# Patient Record
Sex: FEMALE | Race: OTHER | HISPANIC OR LATINO | URBAN - METROPOLITAN AREA
[De-identification: names, ages, dates, MRNs, and addresses within clinical notes are randomized per-mention and may not be internally consistent; named-entity substitution may affect disease eponyms.]

---

## 2017-03-08 ENCOUNTER — EMERGENCY (EMERGENCY)
Facility: HOSPITAL | Age: 32
LOS: 1 days | Discharge: HOME | End: 2017-03-08
Attending: EMERGENCY MEDICINE | Admitting: EMERGENCY MEDICINE
Payer: MEDICAID

## 2017-03-08 VITALS
OXYGEN SATURATION: 100 % | DIASTOLIC BLOOD PRESSURE: 71 MMHG | HEART RATE: 86 BPM | TEMPERATURE: 99 F | RESPIRATION RATE: 17 BRPM | SYSTOLIC BLOOD PRESSURE: 130 MMHG

## 2017-03-08 VITALS
TEMPERATURE: 98 F | DIASTOLIC BLOOD PRESSURE: 80 MMHG | HEART RATE: 80 BPM | SYSTOLIC BLOOD PRESSURE: 110 MMHG | OXYGEN SATURATION: 100 % | RESPIRATION RATE: 16 BRPM

## 2017-03-08 DIAGNOSIS — O20.0 THREATENED ABORTION: ICD-10-CM

## 2017-03-08 LAB
ALBUMIN SERPL ELPH-MCNC: 4.4 G/DL — SIGNIFICANT CHANGE UP (ref 3.3–5)
ALP SERPL-CCNC: 46 U/L — SIGNIFICANT CHANGE UP (ref 40–120)
ALT FLD-CCNC: 12 U/L RC — SIGNIFICANT CHANGE UP (ref 10–45)
ANION GAP SERPL CALC-SCNC: 15 MMOL/L — SIGNIFICANT CHANGE UP (ref 5–17)
AST SERPL-CCNC: 16 U/L — SIGNIFICANT CHANGE UP (ref 10–40)
BILIRUB SERPL-MCNC: 0.2 MG/DL — SIGNIFICANT CHANGE UP (ref 0.2–1.2)
BLD GP AB SCN SERPL QL: NEGATIVE — SIGNIFICANT CHANGE UP
BUN SERPL-MCNC: 12 MG/DL — SIGNIFICANT CHANGE UP (ref 7–23)
CALCIUM SERPL-MCNC: 9.1 MG/DL — SIGNIFICANT CHANGE UP (ref 8.4–10.5)
CHLORIDE SERPL-SCNC: 104 MMOL/L — SIGNIFICANT CHANGE UP (ref 96–108)
CO2 SERPL-SCNC: 21 MMOL/L — LOW (ref 22–31)
CREAT SERPL-MCNC: 0.55 MG/DL — SIGNIFICANT CHANGE UP (ref 0.5–1.3)
GLUCOSE SERPL-MCNC: 80 MG/DL — SIGNIFICANT CHANGE UP (ref 70–99)
HCG SERPL-ACNC: SIGNIFICANT CHANGE UP MIU/ML
HCT VFR BLD CALC: 29.6 % — LOW (ref 34.5–45)
HGB BLD-MCNC: 9.2 G/DL — LOW (ref 11.5–15.5)
MCHC RBC-ENTMCNC: 20.7 PG — LOW (ref 27–34)
MCHC RBC-ENTMCNC: 31.1 GM/DL — LOW (ref 32–36)
MCV RBC AUTO: 66.5 FL — LOW (ref 80–100)
PLATELET # BLD AUTO: 300 K/UL — SIGNIFICANT CHANGE UP (ref 150–400)
POTASSIUM SERPL-MCNC: 3.7 MMOL/L — SIGNIFICANT CHANGE UP (ref 3.5–5.3)
POTASSIUM SERPL-SCNC: 3.7 MMOL/L — SIGNIFICANT CHANGE UP (ref 3.5–5.3)
PROT SERPL-MCNC: 7.3 G/DL — SIGNIFICANT CHANGE UP (ref 6–8.3)
RBC # BLD: 4.46 M/UL — SIGNIFICANT CHANGE UP (ref 3.8–5.2)
RBC # FLD: 19.4 % — HIGH (ref 10.3–14.5)
RH IG SCN BLD-IMP: POSITIVE — SIGNIFICANT CHANGE UP
RH IG SCN BLD-IMP: POSITIVE — SIGNIFICANT CHANGE UP
SODIUM SERPL-SCNC: 140 MMOL/L — SIGNIFICANT CHANGE UP (ref 135–145)
WBC # BLD: 10 K/UL — SIGNIFICANT CHANGE UP (ref 3.8–10.5)
WBC # FLD AUTO: 10 K/UL — SIGNIFICANT CHANGE UP (ref 3.8–10.5)

## 2017-03-08 PROCEDURE — 87624 HPV HI-RISK TYP POOLED RSLT: CPT

## 2017-03-08 PROCEDURE — 99284 EMERGENCY DEPT VISIT MOD MDM: CPT | Mod: 25

## 2017-03-08 PROCEDURE — 76815 OB US LIMITED FETUS(S): CPT | Mod: 26

## 2017-03-08 PROCEDURE — 84702 CHORIONIC GONADOTROPIN TEST: CPT

## 2017-03-08 PROCEDURE — 85027 COMPLETE CBC AUTOMATED: CPT

## 2017-03-08 PROCEDURE — 76817 TRANSVAGINAL US OBSTETRIC: CPT | Mod: 26

## 2017-03-08 PROCEDURE — 86901 BLOOD TYPING SEROLOGIC RH(D): CPT

## 2017-03-08 PROCEDURE — 76817 TRANSVAGINAL US OBSTETRIC: CPT

## 2017-03-08 PROCEDURE — 80053 COMPREHEN METABOLIC PANEL: CPT

## 2017-03-08 PROCEDURE — 86900 BLOOD TYPING SEROLOGIC ABO: CPT

## 2017-03-08 PROCEDURE — 88175 CYTOPATH C/V AUTO FLUID REDO: CPT

## 2017-03-08 PROCEDURE — 76815 OB US LIMITED FETUS(S): CPT

## 2017-03-08 PROCEDURE — 86850 RBC ANTIBODY SCREEN: CPT

## 2017-03-08 PROCEDURE — 88141 CYTOPATH C/V INTERPRET: CPT

## 2017-03-08 PROCEDURE — 88305 TISSUE EXAM BY PATHOLOGIST: CPT | Mod: 26

## 2017-03-08 PROCEDURE — 99285 EMERGENCY DEPT VISIT HI MDM: CPT

## 2017-03-08 PROCEDURE — 88305 TISSUE EXAM BY PATHOLOGIST: CPT

## 2017-03-08 NOTE — ED ADULT NURSE NOTE - OBJECTIVE STATEMENT
Patient   is  alert  and  oriented x3.  Color  is  good and  skin warm to touch.  She  is  c/o  vaginal  bleeding.  She  denies  cramping.

## 2017-03-08 NOTE — ED PROVIDER NOTE - OBJECTIVE STATEMENT
33 y/o F no pmhx LMP 2/12/17 presenting with spotting since yesterday. Pt reports that she has been having breast tenderness and pain for a few days, took a home pregnancy test on Sunday which was positive. States that she started spotting last night, and when it continued today she decided to come in. She reports that she just moved here from [place] and does not have a PMD or OBGYN to follow-up with. Denies any fevers, chills, nausea, vomiting, diarrhea or constipation. Reports changing bad approximately every 5 hours.

## 2017-03-08 NOTE — ED PROVIDER NOTE - CARE PLAN
Principal Discharge DX:	Threatened   Instructions for follow-up, activity and diet:	1. Take tylenol as needed if any pain develops   2. It is necessary for you to follow-up by next week with OBGYN clinic at [phone] to have your threatened pregnancy reevaluated and to have your tissue pathology evaluated.   3. Return to the ER for any new or worsening symptoms. Principal Discharge DX:	Threatened   Instructions for follow-up, activity and diet:	1. Take tylenol as needed if any pain develops   2. It is necessary for you to follow-up by next week with OBGYN clinic at [phone] to have your threatened pregnancy reevaluated and to have your tissue pathology evaluated.   3. Return to the ER for any new or worsening symptoms.

## 2017-03-08 NOTE — ED PROVIDER NOTE - PLAN OF CARE
1. Take tylenol as needed if any pain develops   2. It is necessary for you to follow-up by next week with OBGYN clinic at [phone] to have your threatened pregnancy reevaluated and to have your tissue pathology evaluated.   3. Return to the ER for any new or worsening symptoms.

## 2017-03-08 NOTE — ED PROVIDER NOTE - PROGRESS NOTE DETAILS
OBGYN states patient's pregnancy is visualized in the OS. Calling attending to remove products. -Fadumo Douglas PA-C After reevaluation, OBGYN team states that they reevaluated the US which showed that the pregnancy is too high up to be the cervical mass that is here, and thinking now differential includes placental tissue from aborting twin pregnancy, or cervical tissue from possible cervical CA. patient reports this is a pregnancy that is wanted, therefore will leave things as is and tissue from mass was sent to pathology which will come back next week. OBGYN team discussed with patient her risks of miscarriage at this time with open os and patient understands and wishes to move forward with pregnancy at this time. patient to follow-up in clinic next week for reevaluation and discussion of results of cervical tissue pathology. patient understands and agrees and is stable for d/c at this time. -Fadumo Douglas PA-C

## 2017-03-08 NOTE — ED PROVIDER NOTE - ATTENDING CONTRIBUTION TO CARE
pt is a 31 y/o female pvb about 3-4 weeks by dates with vag spotting the past few weeks now, abd soft, nt. vss. tv us, labs ordered r/o ectopic vs threated ab.

## 2017-03-22 ENCOUNTER — OUTPATIENT (OUTPATIENT)
Dept: OUTPATIENT SERVICES | Facility: HOSPITAL | Age: 32
LOS: 1 days | End: 2017-03-22
Payer: SELF-PAY

## 2017-03-22 ENCOUNTER — APPOINTMENT (OUTPATIENT)
Dept: OBGYN | Facility: CLINIC | Age: 32
End: 2017-03-22

## 2017-03-22 VITALS
BODY MASS INDEX: 26.99 KG/M2 | WEIGHT: 162 LBS | HEIGHT: 65 IN | DIASTOLIC BLOOD PRESSURE: 80 MMHG | SYSTOLIC BLOOD PRESSURE: 122 MMHG

## 2017-03-22 DIAGNOSIS — Z86.2 PERSONAL HISTORY OF DISEASES OF THE BLOOD AND BLOOD-FORMING ORGANS AND CERTAIN DISORDERS INVOLVING THE IMMUNE MECHANISM: ICD-10-CM

## 2017-03-22 DIAGNOSIS — Z32.01 ENCOUNTER FOR PREGNANCY TEST, RESULT POSITIVE: ICD-10-CM

## 2017-03-22 DIAGNOSIS — O20.9 HEMORRHAGE IN EARLY PREGNANCY, UNSPECIFIED: ICD-10-CM

## 2017-03-22 DIAGNOSIS — N84.1 POLYP OF CERVIX UTERI: ICD-10-CM

## 2017-03-22 DIAGNOSIS — N76.0 ACUTE VAGINITIS: ICD-10-CM

## 2017-03-22 PROCEDURE — G0463: CPT | Mod: 25

## 2017-03-22 PROCEDURE — 76857 US EXAM PELVIC LIMITED: CPT

## 2017-03-23 DIAGNOSIS — Z32.01 ENCOUNTER FOR PREGNANCY TEST, RESULT POSITIVE: ICD-10-CM

## 2017-03-23 DIAGNOSIS — N84.1 POLYP OF CERVIX UTERI: ICD-10-CM

## 2017-03-23 DIAGNOSIS — O20.9 HEMORRHAGE IN EARLY PREGNANCY, UNSPECIFIED: ICD-10-CM

## 2017-04-22 ENCOUNTER — RESULT REVIEW (OUTPATIENT)
Age: 32
End: 2017-04-22

## 2017-04-22 ENCOUNTER — EMERGENCY (EMERGENCY)
Facility: HOSPITAL | Age: 32
LOS: 1 days | Discharge: HOME | End: 2017-04-22
Attending: EMERGENCY MEDICINE | Admitting: EMERGENCY MEDICINE
Payer: MEDICAID

## 2017-04-22 VITALS
OXYGEN SATURATION: 100 % | SYSTOLIC BLOOD PRESSURE: 118 MMHG | DIASTOLIC BLOOD PRESSURE: 73 MMHG | TEMPERATURE: 98 F | RESPIRATION RATE: 16 BRPM | HEART RATE: 112 BPM

## 2017-04-22 VITALS
SYSTOLIC BLOOD PRESSURE: 105 MMHG | DIASTOLIC BLOOD PRESSURE: 71 MMHG | TEMPERATURE: 99 F | OXYGEN SATURATION: 100 % | HEART RATE: 79 BPM | RESPIRATION RATE: 17 BRPM

## 2017-04-22 DIAGNOSIS — Z90.49 ACQUIRED ABSENCE OF OTHER SPECIFIED PARTS OF DIGESTIVE TRACT: ICD-10-CM

## 2017-04-22 DIAGNOSIS — O03.9 COMPLETE OR UNSPECIFIED SPONTANEOUS ABORTION WITHOUT COMPLICATION: ICD-10-CM

## 2017-04-22 DIAGNOSIS — R00.0 TACHYCARDIA, UNSPECIFIED: ICD-10-CM

## 2017-04-22 DIAGNOSIS — Z90.49 ACQUIRED ABSENCE OF OTHER SPECIFIED PARTS OF DIGESTIVE TRACT: Chronic | ICD-10-CM

## 2017-04-22 DIAGNOSIS — O20.9 HEMORRHAGE IN EARLY PREGNANCY, UNSPECIFIED: ICD-10-CM

## 2017-04-22 LAB
ANISOCYTOSIS BLD QL: SLIGHT — SIGNIFICANT CHANGE UP
ANISOCYTOSIS BLD QL: SLIGHT — SIGNIFICANT CHANGE UP
APTT BLD: 27.9 SEC — SIGNIFICANT CHANGE UP (ref 27.5–37.4)
BASOPHILS # BLD AUTO: 0.1 K/UL — SIGNIFICANT CHANGE UP (ref 0–0.2)
BASOPHILS # BLD AUTO: 0.1 K/UL — SIGNIFICANT CHANGE UP (ref 0–0.2)
BASOPHILS NFR BLD AUTO: 0.5 % — SIGNIFICANT CHANGE UP (ref 0–2)
BASOPHILS NFR BLD AUTO: 0.8 % — SIGNIFICANT CHANGE UP (ref 0–2)
BLD GP AB SCN SERPL QL: NEGATIVE — SIGNIFICANT CHANGE UP
DACRYOCYTES BLD QL SMEAR: SLIGHT — SIGNIFICANT CHANGE UP
ELLIPTOCYTES BLD QL SMEAR: SLIGHT — SIGNIFICANT CHANGE UP
EOSINOPHIL # BLD AUTO: 0 K/UL — SIGNIFICANT CHANGE UP (ref 0–0.5)
EOSINOPHIL # BLD AUTO: 0.1 K/UL — SIGNIFICANT CHANGE UP (ref 0–0.5)
EOSINOPHIL NFR BLD AUTO: 0.2 % — SIGNIFICANT CHANGE UP (ref 0–6)
EOSINOPHIL NFR BLD AUTO: 0.3 % — SIGNIFICANT CHANGE UP (ref 0–6)
HCG SERPL-ACNC: SIGNIFICANT CHANGE UP MIU/ML
HCT VFR BLD CALC: 24.6 % — LOW (ref 34.5–45)
HCT VFR BLD CALC: 25.8 % — LOW (ref 34.5–45)
HGB BLD-MCNC: 8.1 G/DL — LOW (ref 11.5–15.5)
HGB BLD-MCNC: 8.2 G/DL — LOW (ref 11.5–15.5)
HYPOCHROMIA BLD QL: SIGNIFICANT CHANGE UP
HYPOCHROMIA BLD QL: SIGNIFICANT CHANGE UP
INR BLD: 1.01 RATIO — SIGNIFICANT CHANGE UP (ref 0.88–1.16)
LYMPHOCYTES # BLD AUTO: 1.8 K/UL — SIGNIFICANT CHANGE UP (ref 1–3.3)
LYMPHOCYTES # BLD AUTO: 15.1 % — SIGNIFICANT CHANGE UP (ref 13–44)
LYMPHOCYTES # BLD AUTO: 20.1 % — SIGNIFICANT CHANGE UP (ref 13–44)
LYMPHOCYTES # BLD AUTO: 3.5 K/UL — HIGH (ref 1–3.3)
MACROCYTES BLD QL: SLIGHT — SIGNIFICANT CHANGE UP
MCHC RBC-ENTMCNC: 21.3 PG — LOW (ref 27–34)
MCHC RBC-ENTMCNC: 22.3 PG — LOW (ref 27–34)
MCHC RBC-ENTMCNC: 31.2 GM/DL — LOW (ref 32–36)
MCHC RBC-ENTMCNC: 33.1 GM/DL — SIGNIFICANT CHANGE UP (ref 32–36)
MCV RBC AUTO: 67.2 FL — LOW (ref 80–100)
MCV RBC AUTO: 68.1 FL — LOW (ref 80–100)
MICROCYTES BLD QL: SIGNIFICANT CHANGE UP
MICROCYTES BLD QL: SIGNIFICANT CHANGE UP
MONOCYTES # BLD AUTO: 0.8 K/UL — SIGNIFICANT CHANGE UP (ref 0–0.9)
MONOCYTES # BLD AUTO: 1.2 K/UL — HIGH (ref 0–0.9)
MONOCYTES NFR BLD AUTO: 7 % — SIGNIFICANT CHANGE UP (ref 2–14)
MONOCYTES NFR BLD AUTO: 7 % — SIGNIFICANT CHANGE UP (ref 2–14)
NEUTROPHILS # BLD AUTO: 12.5 K/UL — HIGH (ref 1.8–7.4)
NEUTROPHILS # BLD AUTO: 9.1 K/UL — HIGH (ref 1.8–7.4)
NEUTROPHILS NFR BLD AUTO: 71.7 % — SIGNIFICANT CHANGE UP (ref 43–77)
NEUTROPHILS NFR BLD AUTO: 77.2 % — HIGH (ref 43–77)
PLAT MORPH BLD: NORMAL — SIGNIFICANT CHANGE UP
PLAT MORPH BLD: NORMAL — SIGNIFICANT CHANGE UP
PLATELET # BLD AUTO: 189 K/UL — SIGNIFICANT CHANGE UP (ref 150–400)
PLATELET # BLD AUTO: 213 K/UL — SIGNIFICANT CHANGE UP (ref 150–400)
POIKILOCYTOSIS BLD QL AUTO: SLIGHT — SIGNIFICANT CHANGE UP
POLYCHROMASIA BLD QL SMEAR: SLIGHT — SIGNIFICANT CHANGE UP
PROTHROM AB SERPL-ACNC: 11 SEC — SIGNIFICANT CHANGE UP (ref 9.8–12.7)
RBC # BLD: 3.66 M/UL — LOW (ref 3.8–5.2)
RBC # BLD: 3.79 M/UL — LOW (ref 3.8–5.2)
RBC # FLD: 19.7 % — HIGH (ref 10.3–14.5)
RBC # FLD: 19.7 % — HIGH (ref 10.3–14.5)
RBC BLD AUTO: ABNORMAL
RBC BLD AUTO: ABNORMAL
RH IG SCN BLD-IMP: POSITIVE — SIGNIFICANT CHANGE UP
SCHISTOCYTES BLD QL AUTO: SLIGHT — SIGNIFICANT CHANGE UP
TARGETS BLD QL SMEAR: SLIGHT — SIGNIFICANT CHANGE UP
WBC # BLD: 11.7 K/UL — HIGH (ref 3.8–10.5)
WBC # BLD: 17.4 K/UL — HIGH (ref 3.8–10.5)
WBC # FLD AUTO: 11.7 K/UL — HIGH (ref 3.8–10.5)
WBC # FLD AUTO: 17.4 K/UL — HIGH (ref 3.8–10.5)

## 2017-04-22 PROCEDURE — 99285 EMERGENCY DEPT VISIT HI MDM: CPT

## 2017-04-22 PROCEDURE — 86900 BLOOD TYPING SEROLOGIC ABO: CPT

## 2017-04-22 PROCEDURE — 93975 VASCULAR STUDY: CPT

## 2017-04-22 PROCEDURE — 85730 THROMBOPLASTIN TIME PARTIAL: CPT

## 2017-04-22 PROCEDURE — 86850 RBC ANTIBODY SCREEN: CPT

## 2017-04-22 PROCEDURE — 88309 TISSUE EXAM BY PATHOLOGIST: CPT | Mod: 26

## 2017-04-22 PROCEDURE — 85610 PROTHROMBIN TIME: CPT

## 2017-04-22 PROCEDURE — 99284 EMERGENCY DEPT VISIT MOD MDM: CPT | Mod: 25

## 2017-04-22 PROCEDURE — 93975 VASCULAR STUDY: CPT | Mod: 26

## 2017-04-22 PROCEDURE — 76830 TRANSVAGINAL US NON-OB: CPT | Mod: 26

## 2017-04-22 PROCEDURE — 85027 COMPLETE CBC AUTOMATED: CPT

## 2017-04-22 PROCEDURE — 76830 TRANSVAGINAL US NON-OB: CPT

## 2017-04-22 PROCEDURE — 88309 TISSUE EXAM BY PATHOLOGIST: CPT

## 2017-04-22 PROCEDURE — 76856 US EXAM PELVIC COMPLETE: CPT

## 2017-04-22 PROCEDURE — 86901 BLOOD TYPING SEROLOGIC RH(D): CPT

## 2017-04-22 PROCEDURE — 84702 CHORIONIC GONADOTROPIN TEST: CPT

## 2017-04-22 RX ORDER — SODIUM CHLORIDE 9 MG/ML
1000 INJECTION INTRAMUSCULAR; INTRAVENOUS; SUBCUTANEOUS ONCE
Qty: 0 | Refills: 0 | Status: COMPLETED | OUTPATIENT
Start: 2017-04-22 | End: 2017-04-22

## 2017-04-22 RX ADMIN — SODIUM CHLORIDE 1000 MILLILITER(S): 9 INJECTION INTRAMUSCULAR; INTRAVENOUS; SUBCUTANEOUS at 12:25

## 2017-04-22 NOTE — ED PROVIDER NOTE - PROGRESS NOTE DETAILS
Products examined at bedside, small fetus present. Patient given cytotec per gyn recommendations. Fetus taken to pathology by gyn service. Patient stable after cytotec. Will have her f/u outpatient with obgyn.

## 2017-04-22 NOTE — ED PROVIDER NOTE - CARE PLAN
Principal Discharge DX:	Miscarriage  Instructions for follow-up, activity and diet:	1. Follow up with OBGYN clinic in 7 days.   2. Do not douche or place anything in vagina.   3. Do not engage in sexual intercourse.   4. Return to the ER immediately for worsening pain, increased vaginal bleeding or dizziness or lightheadness.

## 2017-04-22 NOTE — ED ADULT NURSE REASSESSMENT NOTE - NS ED NURSE REASSESS COMMENT FT1
Administered cytotec 600mg. Patient aware of purpose of this medication as well as expected effects and side effects. Patient currently in stable condition.

## 2017-04-22 NOTE — ED PROVIDER NOTE - OBJECTIVE STATEMENT
32 year old pregnant female , presents with complaint of vaginal bleeding. Pt states she passed products of conception at 9am this morning. Denies abdominal pain, lightheadedness, dizziness, SOB, CP. Pt had one US prior that showed IUP. 32 year old pregnant female , 12 weeks presents with complaint of vaginal bleeding this am. Pt states she passed products of conception at 9am this morning. States having moderate amount of bleeding still. Denies abdominal pain, lightheadedness, dizziness, SOB, CP. Pt had one US prior that showed IUP.

## 2017-04-22 NOTE — ED PROVIDER NOTE - PLAN OF CARE
1. Follow up with OBGYN clinic in 7 days.   2. Do not douche or place anything in vagina.   3. Do not engage in sexual intercourse.   4. Return to the ER immediately for worsening pain, increased vaginal bleeding or dizziness or lightheadness.

## 2017-04-22 NOTE — ED PROVIDER NOTE - MEDICAL DECISION MAKING DETAILS
32 year old female 12 weeks pregnant with miscarriage. Tachycardic on exam. Will check blood work, GYN consult, vaginal sonogram to rule out retained products.

## 2017-04-22 NOTE — ED PROVIDER NOTE - NS ED MD SCRIBE ATTENDING SCRIBE SECTIONS
PAST MEDICAL/SURGICAL/SOCIAL HISTORY/PHYSICAL EXAM/VITAL SIGNS( Pullset)/HIV/HISTORY OF PRESENT ILLNESS/INTAKE ASSESSMENT/SCREENINGS/REVIEW OF SYSTEMS

## 2017-04-22 NOTE — ED ADULT NURSE NOTE - OBJECTIVE STATEMENT
32 yr old female 10 weeks preg lmp 2017 c/o vaginal discharge and passing fetus. Pt awake alert and orientedx3 Resp even and nonlab  cap refill wnl Conjunctiva pink Skin turgor normal Denies n/v Denies abd cramping at this time

## 2017-07-25 ENCOUNTER — APPOINTMENT (OUTPATIENT)
Dept: OBGYN | Facility: CLINIC | Age: 32
End: 2017-07-25

## 2017-09-05 ENCOUNTER — RESULT REVIEW (OUTPATIENT)
Age: 32
End: 2017-09-05

## 2017-09-05 ENCOUNTER — OUTPATIENT (OUTPATIENT)
Dept: OUTPATIENT SERVICES | Facility: HOSPITAL | Age: 32
LOS: 1 days | End: 2017-09-05
Payer: MEDICAID

## 2017-09-05 ENCOUNTER — LABORATORY RESULT (OUTPATIENT)
Age: 32
End: 2017-09-05

## 2017-09-05 ENCOUNTER — APPOINTMENT (OUTPATIENT)
Dept: OBGYN | Facility: CLINIC | Age: 32
End: 2017-09-05
Payer: MEDICAID

## 2017-09-05 VITALS
BODY MASS INDEX: 26.99 KG/M2 | SYSTOLIC BLOOD PRESSURE: 100 MMHG | WEIGHT: 162 LBS | HEIGHT: 65 IN | DIASTOLIC BLOOD PRESSURE: 60 MMHG

## 2017-09-05 DIAGNOSIS — Z01.419 ENCOUNTER FOR GYNECOLOGICAL EXAMINATION (GENERAL) (ROUTINE) W/OUT ABNORMAL FINDINGS: ICD-10-CM

## 2017-09-05 DIAGNOSIS — N76.0 ACUTE VAGINITIS: ICD-10-CM

## 2017-09-05 DIAGNOSIS — Z90.49 ACQUIRED ABSENCE OF OTHER SPECIFIED PARTS OF DIGESTIVE TRACT: Chronic | ICD-10-CM

## 2017-09-05 PROCEDURE — 99395 PREV VISIT EST AGE 18-39: CPT

## 2017-09-05 PROCEDURE — 87625 HPV TYPES 16 & 18 ONLY: CPT

## 2017-09-05 PROCEDURE — 87624 HPV HI-RISK TYP POOLED RSLT: CPT

## 2017-09-05 PROCEDURE — G0463: CPT

## 2017-09-06 LAB
C TRACH RRNA SPEC QL NAA+PROBE: SIGNIFICANT CHANGE UP
HPV HIGH+LOW RISK DNA PNL CVX: DETECTED
N GONORRHOEA RRNA SPEC QL NAA+PROBE: SIGNIFICANT CHANGE UP
SPECIMEN SOURCE: SIGNIFICANT CHANGE UP

## 2017-09-09 LAB — CYTOLOGY SPEC DOC CYTO: SIGNIFICANT CHANGE UP

## 2017-09-18 DIAGNOSIS — Z01.419 ENCOUNTER FOR GYNECOLOGICAL EXAMINATION (GENERAL) (ROUTINE) WITHOUT ABNORMAL FINDINGS: ICD-10-CM

## 2017-10-27 ENCOUNTER — LABORATORY RESULT (OUTPATIENT)
Age: 32
End: 2017-10-27

## 2017-10-27 ENCOUNTER — OUTPATIENT (OUTPATIENT)
Dept: OUTPATIENT SERVICES | Facility: HOSPITAL | Age: 32
LOS: 1 days | End: 2017-10-27
Payer: MEDICAID

## 2017-10-27 ENCOUNTER — APPOINTMENT (OUTPATIENT)
Dept: OBGYN | Facility: CLINIC | Age: 32
End: 2017-10-27
Payer: MEDICAID

## 2017-10-27 ENCOUNTER — RESULT CHARGE (OUTPATIENT)
Age: 32
End: 2017-10-27

## 2017-10-27 VITALS
SYSTOLIC BLOOD PRESSURE: 118 MMHG | WEIGHT: 162.13 LBS | DIASTOLIC BLOOD PRESSURE: 78 MMHG | BODY MASS INDEX: 27.01 KG/M2 | HEIGHT: 65 IN

## 2017-10-27 DIAGNOSIS — Z90.49 ACQUIRED ABSENCE OF OTHER SPECIFIED PARTS OF DIGESTIVE TRACT: Chronic | ICD-10-CM

## 2017-10-27 DIAGNOSIS — N92.1 EXCESSIVE AND FREQUENT MENSTRUATION WITH IRREGULAR CYCLE: ICD-10-CM

## 2017-10-27 DIAGNOSIS — N76.0 ACUTE VAGINITIS: ICD-10-CM

## 2017-10-27 LAB
HCG UR QL: NEGATIVE
QUALITY CONTROL: NORMAL

## 2017-10-27 PROCEDURE — 99214 OFFICE O/P EST MOD 30 MIN: CPT

## 2017-10-27 PROCEDURE — G0463: CPT

## 2017-10-28 LAB
C TRACH RRNA SPEC QL NAA+PROBE: SIGNIFICANT CHANGE UP
CANDIDA AB TITR SER: SIGNIFICANT CHANGE UP
G VAGINALIS DNA SPEC QL NAA+PROBE: DETECTED
N GONORRHOEA RRNA SPEC QL NAA+PROBE: SIGNIFICANT CHANGE UP
SPECIMEN SOURCE: SIGNIFICANT CHANGE UP
T VAGINALIS SPEC QL WET PREP: SIGNIFICANT CHANGE UP

## 2017-10-30 ENCOUNTER — MESSAGE (OUTPATIENT)
Age: 32
End: 2017-10-30

## 2017-10-30 DIAGNOSIS — N76.0 ACUTE VAGINITIS: ICD-10-CM

## 2017-10-30 DIAGNOSIS — B96.89 ACUTE VAGINITIS: ICD-10-CM

## 2017-10-31 LAB
FSH SERPL-MCNC: 4.1 IU/L
PROLACTIN SERPL-MCNC: 13 NG/ML
TSH SERPL-ACNC: 1.38 UIU/ML

## 2017-11-01 ENCOUNTER — MEDICATION RENEWAL (OUTPATIENT)
Age: 32
End: 2017-11-01

## 2017-11-02 ENCOUNTER — APPOINTMENT (OUTPATIENT)
Dept: FAMILY MEDICINE | Facility: CLINIC | Age: 32
End: 2017-11-02
Payer: MEDICAID

## 2017-11-02 VITALS — TEMPERATURE: 98.8 F | SYSTOLIC BLOOD PRESSURE: 102 MMHG | DIASTOLIC BLOOD PRESSURE: 66 MMHG

## 2017-11-02 DIAGNOSIS — Z11.3 ENCOUNTER FOR SCREENING FOR INFECTIONS WITH A PREDOMINANTLY SEXUAL MODE OF TRANSMISSION: ICD-10-CM

## 2017-11-02 DIAGNOSIS — Z87.09 PERSONAL HISTORY OF OTHER DISEASES OF THE RESPIRATORY SYSTEM: ICD-10-CM

## 2017-11-02 DIAGNOSIS — Z23 ENCOUNTER FOR IMMUNIZATION: ICD-10-CM

## 2017-11-02 DIAGNOSIS — Z80.3 FAMILY HISTORY OF MALIGNANT NEOPLASM OF BREAST: ICD-10-CM

## 2017-11-02 DIAGNOSIS — Z82.49 FAMILY HISTORY OF ISCHEMIC HEART DISEASE AND OTHER DISEASES OF THE CIRCULATORY SYSTEM: ICD-10-CM

## 2017-11-02 DIAGNOSIS — Z86.69 PERSONAL HISTORY OF OTHER DISEASES OF THE NERVOUS SYSTEM AND SENSE ORGANS: ICD-10-CM

## 2017-11-02 LAB — S PYO AG SPEC QL IA: NEGATIVE

## 2017-11-02 PROCEDURE — 99202 OFFICE O/P NEW SF 15 MIN: CPT

## 2017-11-02 PROCEDURE — 87880 STREP A ASSAY W/OPTIC: CPT | Mod: QW

## 2017-11-02 RX ORDER — METRONIDAZOLE 7.5 MG/G
0.75 GEL VAGINAL
Qty: 0.75 | Refills: 0 | Status: DISCONTINUED | COMMUNITY
Start: 2017-10-30 | End: 2017-11-02

## 2017-11-06 DIAGNOSIS — N92.1 EXCESSIVE AND FREQUENT MENSTRUATION WITH IRREGULAR CYCLE: ICD-10-CM

## 2017-11-07 ENCOUNTER — FORM ENCOUNTER (OUTPATIENT)
Age: 32
End: 2017-11-07

## 2017-11-08 ENCOUNTER — OUTPATIENT (OUTPATIENT)
Dept: OUTPATIENT SERVICES | Facility: HOSPITAL | Age: 32
LOS: 1 days | End: 2017-11-08
Payer: MEDICAID

## 2017-11-08 ENCOUNTER — APPOINTMENT (OUTPATIENT)
Dept: ULTRASOUND IMAGING | Facility: CLINIC | Age: 32
End: 2017-11-08
Payer: MEDICAID

## 2017-11-08 DIAGNOSIS — N92.1 EXCESSIVE AND FREQUENT MENSTRUATION WITH IRREGULAR CYCLE: ICD-10-CM

## 2017-11-08 DIAGNOSIS — Z90.49 ACQUIRED ABSENCE OF OTHER SPECIFIED PARTS OF DIGESTIVE TRACT: Chronic | ICD-10-CM

## 2017-11-08 PROCEDURE — 76856 US EXAM PELVIC COMPLETE: CPT

## 2017-11-08 PROCEDURE — 76856 US EXAM PELVIC COMPLETE: CPT | Mod: 26

## 2017-11-08 PROCEDURE — 76830 TRANSVAGINAL US NON-OB: CPT | Mod: 26

## 2017-11-08 PROCEDURE — 76830 TRANSVAGINAL US NON-OB: CPT

## 2017-11-27 ENCOUNTER — APPOINTMENT (OUTPATIENT)
Dept: OBGYN | Facility: CLINIC | Age: 32
End: 2017-11-27
Payer: MEDICAID

## 2017-11-27 ENCOUNTER — OUTPATIENT (OUTPATIENT)
Dept: OUTPATIENT SERVICES | Facility: HOSPITAL | Age: 32
LOS: 1 days | End: 2017-11-27
Payer: MEDICAID

## 2017-11-27 VITALS
SYSTOLIC BLOOD PRESSURE: 110 MMHG | BODY MASS INDEX: 27.49 KG/M2 | WEIGHT: 165 LBS | HEIGHT: 65 IN | DIASTOLIC BLOOD PRESSURE: 70 MMHG

## 2017-11-27 DIAGNOSIS — Z34.80 ENCOUNTER FOR SUPERVISION OF OTHER NORMAL PREGNANCY, UNSPECIFIED TRIMESTER: ICD-10-CM

## 2017-11-27 DIAGNOSIS — Z92.89 PERSONAL HISTORY OF OTHER MEDICAL TREATMENT: ICD-10-CM

## 2017-11-27 DIAGNOSIS — Z90.49 ACQUIRED ABSENCE OF OTHER SPECIFIED PARTS OF DIGESTIVE TRACT: Chronic | ICD-10-CM

## 2017-11-27 PROCEDURE — 99213 OFFICE O/P EST LOW 20 MIN: CPT | Mod: GC

## 2017-11-27 PROCEDURE — G0463: CPT

## 2017-12-01 ENCOUNTER — LABORATORY RESULT (OUTPATIENT)
Age: 32
End: 2017-12-01

## 2017-12-01 ENCOUNTER — APPOINTMENT (OUTPATIENT)
Dept: FAMILY MEDICINE | Facility: CLINIC | Age: 32
End: 2017-12-01
Payer: MEDICAID

## 2017-12-01 VITALS
BODY MASS INDEX: 26.82 KG/M2 | TEMPERATURE: 98.1 F | RESPIRATION RATE: 14 BRPM | HEART RATE: 77 BPM | WEIGHT: 161 LBS | DIASTOLIC BLOOD PRESSURE: 78 MMHG | SYSTOLIC BLOOD PRESSURE: 112 MMHG | OXYGEN SATURATION: 98 % | HEIGHT: 65 IN

## 2017-12-01 DIAGNOSIS — D64.9 ANEMIA, UNSPECIFIED: ICD-10-CM

## 2017-12-01 DIAGNOSIS — Z23 ENCOUNTER FOR IMMUNIZATION: ICD-10-CM

## 2017-12-01 PROCEDURE — 86580 TB INTRADERMAL TEST: CPT

## 2017-12-01 PROCEDURE — 99395 PREV VISIT EST AGE 18-39: CPT | Mod: 25

## 2017-12-01 PROCEDURE — 36415 COLL VENOUS BLD VENIPUNCTURE: CPT

## 2017-12-01 RX ORDER — AMOXICILLIN 500 MG/1
500 TABLET, FILM COATED ORAL 3 TIMES DAILY
Qty: 21 | Refills: 0 | Status: DISCONTINUED | COMMUNITY
Start: 2017-11-02 | End: 2017-12-01

## 2017-12-04 ENCOUNTER — APPOINTMENT (OUTPATIENT)
Dept: FAMILY MEDICINE | Facility: CLINIC | Age: 32
End: 2017-12-04
Payer: MEDICAID

## 2017-12-04 VITALS — TEMPERATURE: 98.5 F | SYSTOLIC BLOOD PRESSURE: 118 MMHG | DIASTOLIC BLOOD PRESSURE: 72 MMHG

## 2017-12-04 DIAGNOSIS — D56.9 THALASSEMIA, UNSPECIFIED: ICD-10-CM

## 2017-12-04 DIAGNOSIS — Z23 ENCOUNTER FOR IMMUNIZATION: ICD-10-CM

## 2017-12-04 LAB
BASOPHILS # BLD AUTO: 0 K/UL
BASOPHILS NFR BLD AUTO: 0 %
EOSINOPHIL # BLD AUTO: 0.06 K/UL
EOSINOPHIL NFR BLD AUTO: 0.9
FERRITIN SERPL-MCNC: 3 NG/ML
FOLATE SERPL-MCNC: 11.9 NG/ML
HBV SURFACE AB SER QL: ABNORMAL
HCT VFR BLD CALC: 27 %
HGB BLD-MCNC: 7.9 G/DL
IRON SATN MFR SERPL: 4 %
IRON SERPL-MCNC: 17 UG/DL
LYMPHOCYTES # BLD AUTO: 2.22 K/UL
LYMPHOCYTES NFR BLD AUTO: 32.1 %
MAN DIFF?: NORMAL
MCHC RBC-ENTMCNC: 18.5 PG
MCHC RBC-ENTMCNC: 29.3 GM/DL
MCV RBC AUTO: 63.1 FL
MEV IGG FLD QL IA: 51.5 AU/ML
MEV IGG+IGM SER-IMP: POSITIVE
MONOCYTES # BLD AUTO: 0.55 K/UL
MONOCYTES NFR BLD AUTO: 8 %
MUV AB SER-ACNC: POSITIVE
MUV IGG SER QL IA: 113 AU/ML
NEUTROPHILS # BLD AUTO: 4.08 K/UL
NEUTROPHILS NFR BLD AUTO: 57.1 %
PLATELET # BLD AUTO: 322 K/UL
RBC # BLD: 4.28 M/UL
RBC # BLD: 4.28 M/UL
RBC # FLD: 20.1 %
RETICS # AUTO: 1.2 %
RETICS AGGREG/RBC NFR: 53.4 K/UL
RUBV IGG FLD-ACNC: 3.4 INDEX
RUBV IGG SER-IMP: POSITIVE
TIBC SERPL-MCNC: 467 UG/DL
TRANSFERRIN SERPL-MCNC: 371 MG/DL
UIBC SERPL-MCNC: 450 UG/DL
VIT B12 SERPL-MCNC: 551 PG/ML
VZV AB TITR SER: POSITIVE
VZV IGG SER IF-ACNC: 2277 INDEX
WBC # FLD AUTO: 6.93 K/UL

## 2017-12-04 PROCEDURE — 90746 HEPB VACCINE 3 DOSE ADULT IM: CPT

## 2017-12-04 PROCEDURE — 99213 OFFICE O/P EST LOW 20 MIN: CPT | Mod: 25

## 2017-12-04 PROCEDURE — 90471 IMMUNIZATION ADMIN: CPT

## 2017-12-05 DIAGNOSIS — Z92.89 PERSONAL HISTORY OF OTHER MEDICAL TREATMENT: ICD-10-CM

## 2017-12-05 DIAGNOSIS — N93.9 ABNORMAL UTERINE AND VAGINAL BLEEDING, UNSPECIFIED: ICD-10-CM

## 2017-12-07 LAB — HBA1C MFR BLD HPLC: 5.4

## 2018-01-18 ENCOUNTER — APPOINTMENT (OUTPATIENT)
Dept: FAMILY MEDICINE | Facility: CLINIC | Age: 33
End: 2018-01-18
Payer: MEDICAID

## 2018-01-18 VITALS — DIASTOLIC BLOOD PRESSURE: 79 MMHG | SYSTOLIC BLOOD PRESSURE: 116 MMHG | TEMPERATURE: 98.4 F

## 2018-01-18 DIAGNOSIS — J06.9 ACUTE UPPER RESPIRATORY INFECTION, UNSPECIFIED: ICD-10-CM

## 2018-01-18 DIAGNOSIS — B97.89 ACUTE UPPER RESPIRATORY INFECTION, UNSPECIFIED: ICD-10-CM

## 2018-01-18 DIAGNOSIS — R09.82 POSTNASAL DRIP: ICD-10-CM

## 2018-01-18 PROCEDURE — 99214 OFFICE O/P EST MOD 30 MIN: CPT

## 2018-01-18 RX ORDER — VITAMIN A ACETATE, .BETA.-CAROTENE, ASCORBIC ACID, CHOLECALCIFEROL, .ALPHA.-TOCOPHEROL ACETATE, DL-, THIAMINE MONONITRATE, RIBOFLAVIN, NIACINAMIDE, PYRIDOXINE HYDROCHLORIDE, FOLIC ACID, CYANOCOBALAMIN, CALCIUM CARBONATE, FERROUS FUMARATE, ZINC OXIDE, CUPRIC OXIDE 3080; 920; 120; 400; 22; 1.84; 3; 20; 10; 1; 12; 200; 27; 25; 2 [IU]/1; [IU]/1; MG/1; [IU]/1; MG/1; MG/1; MG/1; MG/1; MG/1; MG/1; UG/1; MG/1; MG/1; MG/1; MG/1
27-1 TABLET, FILM COATED ORAL DAILY
Qty: 1 | Refills: 0 | Status: DISCONTINUED | COMMUNITY
Start: 2017-12-01 | End: 2018-01-18

## 2018-01-21 ENCOUNTER — TRANSCRIPTION ENCOUNTER (OUTPATIENT)
Age: 33
End: 2018-01-21

## 2018-03-08 ENCOUNTER — LABORATORY RESULT (OUTPATIENT)
Age: 33
End: 2018-03-08

## 2018-03-08 ENCOUNTER — APPOINTMENT (OUTPATIENT)
Dept: FAMILY MEDICINE | Facility: CLINIC | Age: 33
End: 2018-03-08
Payer: MEDICAID

## 2018-03-08 VITALS — SYSTOLIC BLOOD PRESSURE: 100 MMHG | DIASTOLIC BLOOD PRESSURE: 70 MMHG | TEMPERATURE: 97.8 F

## 2018-03-08 PROCEDURE — 36415 COLL VENOUS BLD VENIPUNCTURE: CPT

## 2018-03-08 PROCEDURE — 99213 OFFICE O/P EST LOW 20 MIN: CPT | Mod: 25

## 2018-03-08 RX ORDER — FLUTICASONE PROPIONATE 50 UG/1
50 SPRAY, METERED NASAL TWICE DAILY
Qty: 1 | Refills: 0 | Status: DISCONTINUED | COMMUNITY
Start: 2018-01-18 | End: 2018-03-08

## 2018-03-08 RX ORDER — PROMETHAZINE HYDROCHLORIDE AND DEXTROMETHORPHAN HYDROBROMIDE ORAL SOLUTION 15; 6.25 MG/5ML; MG/5ML
6.25-15 SOLUTION ORAL
Qty: 1 | Refills: 0 | Status: DISCONTINUED | COMMUNITY
Start: 2018-01-18 | End: 2018-03-08

## 2018-03-12 LAB
BASOPHILS # BLD AUTO: 0.01 K/UL
BASOPHILS NFR BLD AUTO: 0.1 %
EOSINOPHIL # BLD AUTO: 0.03 K/UL
EOSINOPHIL NFR BLD AUTO: 0.3 %
FERRITIN SERPL-MCNC: 11 NG/ML
FOLATE SERPL-MCNC: 13.4 NG/ML
HCT VFR BLD CALC: 33.4 %
HGB BLD-MCNC: 10.4 G/DL
IMM GRANULOCYTES NFR BLD AUTO: 0.2 %
IRON SATN MFR SERPL: 25 %
IRON SERPL-MCNC: 99 UG/DL
LYMPHOCYTES # BLD AUTO: 2.77 K/UL
LYMPHOCYTES NFR BLD AUTO: 30.1 %
MAN DIFF?: NORMAL
MCHC RBC-ENTMCNC: 22.6 PG
MCHC RBC-ENTMCNC: 31.1 GM/DL
MCV RBC AUTO: 72.6 FL
MONOCYTES # BLD AUTO: 0.8 K/UL
MONOCYTES NFR BLD AUTO: 8.7 %
NEUTROPHILS # BLD AUTO: 5.57 K/UL
NEUTROPHILS NFR BLD AUTO: 60.6 %
PLATELET # BLD AUTO: 321 K/UL
RBC # BLD: 4.6 M/UL
RBC # BLD: 4.6 M/UL
RBC # FLD: 20.5 %
RETICS # AUTO: 0.9 %
RETICS AGGREG/RBC NFR: 40.9 K/UL
TIBC SERPL-MCNC: 395 UG/DL
TRANSFERRIN SERPL-MCNC: 357 MG/DL
UIBC SERPL-MCNC: 296 UG/DL
VIT B12 SERPL-MCNC: 581 PG/ML
WBC # FLD AUTO: 9.2 K/UL

## 2018-03-29 ENCOUNTER — APPOINTMENT (OUTPATIENT)
Dept: FAMILY MEDICINE | Facility: CLINIC | Age: 33
End: 2018-03-29

## 2018-07-28 ENCOUNTER — RESULT REVIEW (OUTPATIENT)
Age: 33
End: 2018-07-28

## 2018-07-31 NOTE — ED ADULT NURSE NOTE - FALL HARM RISK TYPE OF ASSESSMENT
Office Visit Chart Prep  Charlie May is scheduled to see Daniela Delacruz MD on 8/28/2018.    The primary care provider/referring provider is Andrea Dey MD and the patient is being seen for nocturnal enuresis and urinary incontinence  The last appointment was 5/15/18 with Daniela Delacruz MD at [facility] in [facility] and the recommendations were:    Recommended trying 2 tabs of DDAVP at night. If no improvement, we will have to obtain some urine volumes at night.   Follow up in 3 months.     Does this encounter need to be followed up on? No    
Admission

## 2018-12-01 ENCOUNTER — RESULT REVIEW (OUTPATIENT)
Age: 33
End: 2018-12-01

## 2019-01-16 ENCOUNTER — APPOINTMENT (OUTPATIENT)
Dept: FAMILY MEDICINE | Facility: CLINIC | Age: 34
End: 2019-01-16
Payer: COMMERCIAL

## 2019-01-16 VITALS — DIASTOLIC BLOOD PRESSURE: 70 MMHG | SYSTOLIC BLOOD PRESSURE: 100 MMHG | TEMPERATURE: 98 F

## 2019-01-16 PROCEDURE — 99213 OFFICE O/P EST LOW 20 MIN: CPT | Mod: 25

## 2019-01-16 PROCEDURE — 36415 COLL VENOUS BLD VENIPUNCTURE: CPT

## 2019-01-18 LAB
BASOPHILS # BLD AUTO: 0.01 K/UL
BASOPHILS NFR BLD AUTO: 0.2 %
EOSINOPHIL # BLD AUTO: 0.04 K/UL
EOSINOPHIL NFR BLD AUTO: 0.7 %
FERRITIN SERPL-MCNC: 6 NG/ML
FOLATE SERPL-MCNC: >20 NG/ML
HCT VFR BLD CALC: 29.9 %
HGB BLD-MCNC: 8.8 G/DL
IMM GRANULOCYTES NFR BLD AUTO: 0.2 %
IRON SATN MFR SERPL: 5 %
IRON SERPL-MCNC: 21 UG/DL
LYMPHOCYTES # BLD AUTO: 1.7 K/UL
LYMPHOCYTES NFR BLD AUTO: 28.7 %
MAN DIFF?: NORMAL
MCHC RBC-ENTMCNC: 21.1 PG
MCHC RBC-ENTMCNC: 29.4 GM/DL
MCV RBC AUTO: 71.7 FL
MONOCYTES # BLD AUTO: 0.58 K/UL
MONOCYTES NFR BLD AUTO: 9.8 %
NEUTROPHILS # BLD AUTO: 3.59 K/UL
NEUTROPHILS NFR BLD AUTO: 60.4 %
PLATELET # BLD AUTO: 317 K/UL
RBC # BLD: 4.17 M/UL
RBC # BLD: 4.17 M/UL
RBC # FLD: 18.7 %
RETICS # AUTO: 1.8 %
RETICS AGGREG/RBC NFR: 76.7 K/UL
TIBC SERPL-MCNC: 446 UG/DL
TRANSFERRIN SERPL-MCNC: 351 MG/DL
TSH SERPL-ACNC: 2.8 UIU/ML
UIBC SERPL-MCNC: 425 UG/DL
VIT B12 SERPL-MCNC: 571 PG/ML
WBC # FLD AUTO: 5.93 K/UL

## 2019-02-16 ENCOUNTER — RESULT REVIEW (OUTPATIENT)
Age: 34
End: 2019-02-16

## 2019-03-30 ENCOUNTER — RESULT REVIEW (OUTPATIENT)
Age: 34
End: 2019-03-30

## 2019-05-23 ENCOUNTER — TRANSCRIPTION ENCOUNTER (OUTPATIENT)
Age: 34
End: 2019-05-23

## 2019-08-06 ENCOUNTER — APPOINTMENT (OUTPATIENT)
Dept: FAMILY MEDICINE | Facility: CLINIC | Age: 34
End: 2019-08-06
Payer: COMMERCIAL

## 2019-08-06 VITALS — DIASTOLIC BLOOD PRESSURE: 60 MMHG | SYSTOLIC BLOOD PRESSURE: 110 MMHG | TEMPERATURE: 98.4 F

## 2019-08-06 DIAGNOSIS — M54.2 CERVICALGIA: ICD-10-CM

## 2019-08-06 PROCEDURE — 99213 OFFICE O/P EST LOW 20 MIN: CPT

## 2019-09-30 ENCOUNTER — INPATIENT (INPATIENT)
Facility: HOSPITAL | Age: 34
LOS: 24 days | Discharge: HOME | End: 2019-10-25
Attending: OBSTETRICS & GYNECOLOGY | Admitting: OBSTETRICS & GYNECOLOGY
Payer: COMMERCIAL

## 2019-09-30 ENCOUNTER — ASOB RESULT (OUTPATIENT)
Age: 34
End: 2019-09-30

## 2019-09-30 ENCOUNTER — APPOINTMENT (OUTPATIENT)
Dept: ANTEPARTUM | Facility: CLINIC | Age: 34
End: 2019-09-30

## 2019-09-30 VITALS
TEMPERATURE: 98 F | HEART RATE: 90 BPM | DIASTOLIC BLOOD PRESSURE: 67 MMHG | SYSTOLIC BLOOD PRESSURE: 116 MMHG | RESPIRATION RATE: 18 BRPM

## 2019-09-30 DIAGNOSIS — Z98.890 OTHER SPECIFIED POSTPROCEDURAL STATES: Chronic | ICD-10-CM

## 2019-09-30 DIAGNOSIS — O42.90 PREMATURE RUPTURE OF MEMBRANES, UNSPECIFIED AS TO LENGTH OF TIME BETWEEN RUPTURE AND ONSET OF LABOR, UNSPECIFIED WEEKS OF GESTATION: ICD-10-CM

## 2019-09-30 DIAGNOSIS — O26.899 OTHER SPECIFIED PREGNANCY RELATED CONDITIONS, UNSPECIFIED TRIMESTER: ICD-10-CM

## 2019-09-30 DIAGNOSIS — Z90.49 ACQUIRED ABSENCE OF OTHER SPECIFIED PARTS OF DIGESTIVE TRACT: Chronic | ICD-10-CM

## 2019-09-30 DIAGNOSIS — Z3A.00 WEEKS OF GESTATION OF PREGNANCY NOT SPECIFIED: ICD-10-CM

## 2019-09-30 LAB
AMNISURE ROM (RUPTURE OF MEMBRANES): POSITIVE — SIGNIFICANT CHANGE UP
BASOPHILS # BLD AUTO: 0.02 K/UL — SIGNIFICANT CHANGE UP (ref 0–0.2)
BASOPHILS NFR BLD AUTO: 0.2 % — SIGNIFICANT CHANGE UP (ref 0–2)
BLD GP AB SCN SERPL QL: NEGATIVE — SIGNIFICANT CHANGE UP
EOSINOPHIL # BLD AUTO: 0.06 K/UL — SIGNIFICANT CHANGE UP (ref 0–0.5)
EOSINOPHIL NFR BLD AUTO: 0.6 % — SIGNIFICANT CHANGE UP (ref 0–6)
HCT VFR BLD CALC: 32.6 % — LOW (ref 34.5–45)
HGB BLD-MCNC: 10.1 G/DL — LOW (ref 11.5–15.5)
IMM GRANULOCYTES NFR BLD AUTO: 1 % — SIGNIFICANT CHANGE UP (ref 0–1.5)
LYMPHOCYTES # BLD AUTO: 1.54 K/UL — SIGNIFICANT CHANGE UP (ref 1–3.3)
LYMPHOCYTES # BLD AUTO: 14.6 % — SIGNIFICANT CHANGE UP (ref 13–44)
MAGNESIUM SERPL-MCNC: 4.6 MG/DL — HIGH (ref 1.6–2.6)
MCHC RBC-ENTMCNC: 24.6 PG — LOW (ref 27–34)
MCHC RBC-ENTMCNC: 31 % — LOW (ref 32–36)
MCV RBC AUTO: 79.3 FL — LOW (ref 80–100)
MONOCYTES # BLD AUTO: 0.8 K/UL — SIGNIFICANT CHANGE UP (ref 0–0.9)
MONOCYTES NFR BLD AUTO: 7.6 % — SIGNIFICANT CHANGE UP (ref 2–14)
NEUTROPHILS # BLD AUTO: 8 K/UL — HIGH (ref 1.8–7.4)
NEUTROPHILS NFR BLD AUTO: 76 % — SIGNIFICANT CHANGE UP (ref 43–77)
NRBC # FLD: 0 K/UL — SIGNIFICANT CHANGE UP (ref 0–0)
PLATELET # BLD AUTO: 270 K/UL — SIGNIFICANT CHANGE UP (ref 150–400)
PMV BLD: 11.9 FL — SIGNIFICANT CHANGE UP (ref 7–13)
RBC # BLD: 4.11 M/UL — SIGNIFICANT CHANGE UP (ref 3.8–5.2)
RBC # FLD: 15.4 % — HIGH (ref 10.3–14.5)
RH IG SCN BLD-IMP: POSITIVE — SIGNIFICANT CHANGE UP
RH IG SCN BLD-IMP: POSITIVE — SIGNIFICANT CHANGE UP
T PALLIDUM AB TITR SER: NEGATIVE — SIGNIFICANT CHANGE UP
WBC # BLD: 10.53 K/UL — HIGH (ref 3.8–10.5)
WBC # FLD AUTO: 10.53 K/UL — HIGH (ref 3.8–10.5)

## 2019-09-30 RX ORDER — ONDANSETRON 8 MG/1
4 TABLET, FILM COATED ORAL ONCE
Refills: 0 | Status: COMPLETED | OUTPATIENT
Start: 2019-09-30 | End: 2019-09-30

## 2019-09-30 RX ORDER — MAGNESIUM SULFATE 500 MG/ML
4 VIAL (ML) INJECTION ONCE
Refills: 0 | Status: COMPLETED | OUTPATIENT
Start: 2019-09-30 | End: 2019-09-30

## 2019-09-30 RX ORDER — AMPICILLIN TRIHYDRATE 250 MG
CAPSULE ORAL
Refills: 0 | Status: DISCONTINUED | OUTPATIENT
Start: 2019-09-30 | End: 2019-10-01

## 2019-09-30 RX ORDER — PROGESTERONE 200 MG/1
100 CAPSULE, LIQUID FILLED ORAL AT BEDTIME
Refills: 0 | Status: DISCONTINUED | OUTPATIENT
Start: 2019-09-30 | End: 2019-09-30

## 2019-09-30 RX ORDER — MAGNESIUM SULFATE 500 MG/ML
2 VIAL (ML) INJECTION
Qty: 40 | Refills: 0 | Status: DISCONTINUED | OUTPATIENT
Start: 2019-09-30 | End: 2019-10-01

## 2019-09-30 RX ORDER — INFLUENZA VIRUS VACCINE 15; 15; 15; 15 UG/.5ML; UG/.5ML; UG/.5ML; UG/.5ML
0.5 SUSPENSION INTRAMUSCULAR ONCE
Refills: 0 | Status: DISCONTINUED | OUTPATIENT
Start: 2019-09-30 | End: 2019-10-25

## 2019-09-30 RX ORDER — AMPICILLIN TRIHYDRATE 250 MG
2 CAPSULE ORAL EVERY 6 HOURS
Refills: 0 | Status: DISCONTINUED | OUTPATIENT
Start: 2019-09-30 | End: 2019-10-01

## 2019-09-30 RX ORDER — SODIUM CHLORIDE 9 MG/ML
1000 INJECTION, SOLUTION INTRAVENOUS
Refills: 0 | Status: DISCONTINUED | OUTPATIENT
Start: 2019-09-30 | End: 2019-10-02

## 2019-09-30 RX ORDER — AZITHROMYCIN 500 MG/1
1000 TABLET, FILM COATED ORAL ONCE
Refills: 0 | Status: COMPLETED | OUTPATIENT
Start: 2019-09-30 | End: 2019-09-30

## 2019-09-30 RX ORDER — AMOXICILLIN 250 MG/5ML
500 SUSPENSION, RECONSTITUTED, ORAL (ML) ORAL EVERY 8 HOURS
Refills: 0 | Status: DISCONTINUED | OUTPATIENT
Start: 2019-09-30 | End: 2019-10-01

## 2019-09-30 RX ORDER — AMPICILLIN TRIHYDRATE 250 MG
2 CAPSULE ORAL ONCE
Refills: 0 | Status: COMPLETED | OUTPATIENT
Start: 2019-09-30 | End: 2019-09-30

## 2019-09-30 RX ADMIN — Medication 12 MILLIGRAM(S): at 12:58

## 2019-09-30 RX ADMIN — Medication 116 GRAM(S): at 13:00

## 2019-09-30 RX ADMIN — ONDANSETRON 4 MILLIGRAM(S): 8 TABLET, FILM COATED ORAL at 22:08

## 2019-09-30 RX ADMIN — Medication 50 GM/HR: at 13:20

## 2019-09-30 RX ADMIN — Medication 50 GM/HR: at 19:21

## 2019-09-30 RX ADMIN — Medication 116 GRAM(S): at 18:00

## 2019-09-30 RX ADMIN — SODIUM CHLORIDE 75 MILLILITER(S): 9 INJECTION, SOLUTION INTRAVENOUS at 12:47

## 2019-09-30 RX ADMIN — Medication 200 GRAM(S): at 12:48

## 2019-09-30 RX ADMIN — AZITHROMYCIN 1000 MILLIGRAM(S): 500 TABLET, FILM COATED ORAL at 18:09

## 2019-09-30 NOTE — OB PROVIDER TRIAGE NOTE - HISTORY OF PRESENT ILLNESS
patient is a 33 y/o EDC 2020 EGA 25 reports of leaking water 2019. Denies cramping, contractions. Reports of fetal movement.    AP complication: head measurements are small as per patient  Medical History: Denies  Surgical History: LEEP, Appendectomy 16 y/o ago, 2019 D&C x 1 for heavy bleeding  OBGYN History:   -2002  Female 6.9  patient is a 35 y/o EDC 2020 EGA 25 reports of leaking water 2019 at 1600. Denies cramping, contractions. Reports of fetal movement. Patient was seen in the office today. Office report indicates Nitrazine positive, pooling of amniotic fluid. Sono indicates fetus is breech presentation, vasa previa,  gram    AP complication: head measurements are small as per patient  Medical History: Denies  Surgical History: LEEP, Appendectomy 16 y/o ago, 2019 D&C x 1 for heavy bleeding  OBGYN History:   -2002  Female 6.9  -SAB x1  -history of chlamydia

## 2019-09-30 NOTE — OB PROVIDER H&P - ASSESSMENT
patient is a 33 y/o EDC 2020 EGA 25 reports of leaking water 2019 at 1600. Denies cramping, contractions. Reports of fetal movement. Patient was seen in the office today. Office report indicates Nitrazine positive, pooling of amniotic fluid.   2019 sono: fetus is breech presentation, vasa previa,  gram and cervical length 1.3    AP complication:  - Head multiple anomalies, low set ears, hypotelorism, cerebellum small for gestation  - cerclage placed 9/10  - incompetent cervix 1.75 cm, after cerclage 3.16cm  Medical History: Denies  Surgical History: LEEP, Appendectomy 16 y/o ago, 2019 D&C x 1 for heavy bleeding  OBGYN History:   -2002  Female 6.9  -SAB x1  -history of chlamydia    Vital Signs Last 24 Hrs  T(C): 36.8 (30 Sep 2019 11:35), Max: 36.8 (30 Sep 2019 11:35)  T(F): 98.2 (30 Sep 2019 11:35), Max: 98.2 (30 Sep 2019 11:35)  HR: 90 (30 Sep 2019 12:01) (90 - 90)  BP: 116/67 (30 Sep 2019 12:01) (116/67 - 116/67)  RR: 18 (30 Sep 2019 11:35) (18 - 18)  TAS: breech presentation, no pocket fluid seen  SSE: no pooling fluid, nitrazine negative, fern negative, cerclage in place  SVE: cerclage intact  Amnisure: Positive    Evidence of premature rupture of membrane  - admit to labor and delivery, d/w ,   - for Betamethasone Antibiotics, Magnesium Sulfate  - NPO at this time  - for urgent ATU scan

## 2019-09-30 NOTE — PROGRESS NOTE ADULT - SUBJECTIVE AND OBJECTIVE BOX
R4/MFM attending    Pt seen and examined with Dr. Pryor. In short, she is a 34  @ 25w0d with cerclage in place (9/10) and PPROM on  confirmed on exam in the office and here in triage. She denies ctx, vb, and reports +FM.   Vaginal exam showing closed cervix without tension on the stitch.   ATU scan showing breech, EFW 550g with oligohydramnios and possible pericardial effusion.     Will admit to labor and delivery for BMZ, Mg, and latency antibiotics.

## 2019-09-30 NOTE — CHART NOTE - NSCHARTNOTEFT_GEN_A_CORE
I explained to Ms. Crisostomo what to expect if she were to deliver at 25 weeks GA:  1. NICU team will be present at the time of delivery, and her baby will be placed under the warmer and immediately evaluated.  2. Due to severe prematurity, baby will require extensive resuscitation at the time of delivery, including intubation, mechanical ventilation and life-saving medications.   3. The baby will need prolonged respiratory support and given surfactant immediately at delivery or soon after. Due to the prematurity of the lungs there is a likelihood that the baby could sustain damage to the lungs while on the ventilator, but that all precautions will be taken to try to minimize the effects.   4. The baby will need blood transfusions just from routine blood draws and that he will not be producing enough of his own blood. In order to have IV access lines would be placed in the umbilical cord vessels and later long term lines such as PICC lines would be needed. Through these same lines the baby would receive IV fluids and nutrition.  5. Due to prematurity, the infant is at risk for hypoglycemia; will be monitored closely and managed accordingly  6. Due to the severe prematurity, his immune system will not be strong enough to fight infections. The baby will be screened for infections and started on antibiotics. The baby may receive multiple courses of antibiotics during his hospital course if an infection is suspected.   7. Feedings will not be started initially due to severe prematurity. The importance of breast milk as a source of nutrition and colostrum care was discussed at length  8. The potential for bleeding was discussed with an emphasis on bleeding in the brain. The fragility of the vessels in the brain at this gestation is severe and that with small changes in blood pressure could result in bleeding. This bleeding especially if higher grade could cause severe developmental delays and morbidity. Bleeding can be monitored with head ultrasounds at intervals.   9. The baby will be at risk for jaundice which can be treated with phototherapy.      Ms. Crisostomo had the opportunity to questions; if she has any more questions, feel free to contact the NICU team at that time.  Thank you for the consult and please inform us of any changes in her condition.       ________________________________________ 34 yr old  mother at 25 weeks of gestation presented with PPROM since 19 1600. Prenatal hx significant for vasa previa and breech presentation. Fetal Echo with pleural effusion. Repeat echo to be done on 10/1/19 at 11 am. Mother received Mg bolus and maintenance, betamethasone 1st dose today at 1258 and started on ampicillin and azithromycin. Prenatal labs : HIV neg, RPR non-reactive, HBsAg negative, rubella immune, GBS unknown ( sent ).   I explained to Ms. Crisostomo what to expect if she were to deliver at 25 weeks GA:  1. NICU team will be present at the time of delivery, and her baby will be placed under the warmer and immediately evaluated.  2. Due to severe prematurity, baby will require extensive resuscitation at the time of delivery, including intubation, mechanical ventilation and life-saving medications.   3. Due to prematurity, the infant may have problem maintaining normal BPs; will be monitored closely and may be given medications to support his BP.   4. In order to have IV access lines would be placed through the umbilical cord vessels and later on long term lines such as PICC lines would be needed. Through these same lines the baby would receive IV fluids and nutrition.  5. The baby will need prolonged respiratory support and given surfactant immediately at delivery or soon after. Due to the prematurity of the lungs there is a likelihood that the baby could sustain damage to the lungs while on the ventilator; but all precautions will be taken to try to minimize these effects.   6. Due to prematurity and routine blood draws, your infant is at risk for anemia. If severe enough, may require blood transfusions.   7. Due to prematurity, the infant is at risk for hypoglycemia and hypothermia  8. Due to prematurity, the immune system will not be strong enough to fight infections. The baby will be screened for infections and started on antibiotics. The baby may receive multiple courses of antibiotics during his hospital course if an infection is suspected.   9. Feedings will not be started initially due to severe prematurity. The importance of breast milk as a source of nutrition and colostrum care was discussed at length  10. The potential for bleeding was discussed with an emphasis on bleeding in the brain. The fragility of the vessels in the brain at this gestation is severe and that with small changes in blood pressure could result in bleeding. This bleeding especially if higher grade could cause severe developmental delays and morbidity. Bleeding can be monitored with head ultrasounds done at intervals.   11. The baby will be at risk for jaundice which can be treated with phototherapy.      Ms. Crisostomo had the opportunity to questions; if she has any more questions, feel free to contact the NICU team at that time.  Thank you for the consult and please inform us of any changes in her condition.       ________________________________________

## 2019-09-30 NOTE — PROGRESS NOTE ADULT - ATTENDING COMMENTS
PAtient seen and examined  Has SROM confirmed by exam.  Ultrasound was done and no vasaprevia was noted.  Will continue conservative management  Fetal echocardiogram

## 2019-09-30 NOTE — OB PROVIDER TRIAGE NOTE - NSHPPHYSICALEXAM_GEN_ALL_CORE
Vital Signs Last 24 Hrs  T(C): 36.8 (30 Sep 2019 11:35), Max: 36.8 (30 Sep 2019 11:35)  T(F): 98.2 (30 Sep 2019 11:35), Max: 98.2 (30 Sep 2019 11:35)  HR: 90 (30 Sep 2019 12:01) (90 - 90)  BP: 116/67 (30 Sep 2019 12:01) (116/67 - 116/67)  RR: 18 (30 Sep 2019 11:35) (18 - 18)  TAS: breech presentation, no pocket fluid seen  SSE: no pooling fluid, nitrazine negative, fern negative, cerclage in place  SVE: cerclage intact

## 2019-09-30 NOTE — OB PROVIDER TRIAGE NOTE - NSOBPROVIDERNOTE_OBGYN_ALL_OB_FT
patient is a 33 y/o EDC 2020 EGA 25 reports of leaking water 2019 at 1600. Denies cramping, contractions. Reports of fetal movement. Patient was seen in the office today. Office report indicates Nitrazine positive, pooling of amniotic fluid. Sono indicates fetus is breech presentation, vasa previa,  gram and cervical length 1.3    AP complication:  - Head multiple anomalies, low set ears, hypotelorism, cerebellum small for gestation  - cerclage placed 9/10  - incompetent cervix 1.75 cm, after cerclage 3.16cm  Medical History: Denies  Surgical History: LEEP, Appendectomy 16 y/o ago, 2019 D&C x 1 for heavy bleeding  OBGYN History:   -2002  Female 6.9  -SAB x1  -history of chlamydia    Vital Signs Last 24 Hrs  T(C): 36.8 (30 Sep 2019 11:35), Max: 36.8 (30 Sep 2019 11:35)  T(F): 98.2 (30 Sep 2019 11:35), Max: 98.2 (30 Sep 2019 11:35)  HR: 90 (30 Sep 2019 12:01) (90 - 90)  BP: 116/67 (30 Sep 2019 12:01) (116/67 - 116/67)  RR: 18 (30 Sep 2019 11:35) (18 - 18)  TAS: breech presentation, no pocket fluid seen  SSE: no pooling fluid, nitrazine negative, fern negative, cerclage in place  SVE: cerclage intact  Aminsure: positive

## 2019-09-30 NOTE — OB PROVIDER H&P - HISTORY OF PRESENT ILLNESS
patient is a 33 y/o EDC 2020 EGA 25 reports of leaking water 2019 at 1600. Denies cramping, contractions. Reports of fetal movement. Patient was seen in the office today. Office report indicates Nitrazine positive, pooling of amniotic fluid. Sono indicates fetus is breech presentation, vasa previa,  gram    AP complication: head measurements are small as per patient  Medical History: Denies  Surgical History: LEEP, Appendectomy 16 y/o ago, 2019 D&C x 1 for heavy bleeding  OBGYN History:   -2002  Female 6.9  -SAB x1  -history of chlamydia

## 2019-10-01 LAB
HIV COMBO RESULT: SIGNIFICANT CHANGE UP
HIV1+2 AB SPEC QL: SIGNIFICANT CHANGE UP
MAGNESIUM SERPL-MCNC: 5.3 MG/DL — HIGH (ref 1.6–2.6)

## 2019-10-01 PROCEDURE — 93325 DOPPLER ECHO COLOR FLOW MAPG: CPT | Mod: 26

## 2019-10-01 PROCEDURE — 76827 ECHO EXAM OF FETAL HEART: CPT | Mod: 26

## 2019-10-01 PROCEDURE — 76825 ECHO EXAM OF FETAL HEART: CPT | Mod: 26

## 2019-10-01 PROCEDURE — 99232 SBSQ HOSP IP/OBS MODERATE 35: CPT | Mod: GC

## 2019-10-01 RX ORDER — AMOXICILLIN 250 MG/5ML
500 SUSPENSION, RECONSTITUTED, ORAL (ML) ORAL EVERY 8 HOURS
Refills: 0 | Status: COMPLETED | OUTPATIENT
Start: 2019-10-01

## 2019-10-01 RX ORDER — AMPICILLIN TRIHYDRATE 250 MG
2 CAPSULE ORAL EVERY 6 HOURS
Refills: 0 | Status: DISCONTINUED | OUTPATIENT
Start: 2019-10-01 | End: 2019-10-02

## 2019-10-01 RX ADMIN — Medication 116 GRAM(S): at 00:45

## 2019-10-01 RX ADMIN — Medication 12 MILLIGRAM(S): at 13:16

## 2019-10-01 RX ADMIN — Medication 216 GRAM(S): at 13:53

## 2019-10-01 RX ADMIN — Medication 116 GRAM(S): at 06:57

## 2019-10-01 RX ADMIN — Medication 1 TABLET(S): at 15:41

## 2019-10-01 RX ADMIN — Medication 216 GRAM(S): at 20:05

## 2019-10-01 NOTE — PROGRESS NOTE ADULT - SUBJECTIVE AND OBJECTIVE BOX
R3 Antepartum Progress Note - HD#2    Subjective  Patient seen and examined at bedside, no acute overnight events. This AM, no acute complaints. Continues to report loss of clear fluid. Denies CTX, vaginal bleeding, fever, chills, sweats, abdominal pain.     Objective  Vital Signs Last 24 Hours  T(C): 36.8 (10-01-19 @ 03:30), Max: 36.8 (09-30-19 @ 11:35)  HR: 82 (10-01-19 @ 01:33) (81 - 125)  BP: 107/62 (10-01-19 @ 00:48) (92/51 - 123/69)  RR: 20 (09-30-19 @ 14:26) (18 - 20)  SpO2: 98% (10-01-19 @ 01:33) (94% - 100%)    Physical Exam:  General: NAD  CV: RRR, no murmurs, S1, S2  Resp: CTA-B, symmetrical expansino  Abdomen: Soft, non-tender, non-distended, gravid   : clear fluid on pad  Ext: no edema/tenderness in LE B/l    Labs:    Blood Type: A Positive  Antibody Screen: --  RPR: Negative               10.1   10.53 )-----------( 270      ( 09-30 @ 13:00 )             32.6         MEDICATIONS  (STANDING):  amoxicillin 500 milliGRAM(s) Oral every 8 hours  ampicillin  IVPB      ampicillin  IVPB 2 Gram(s) IV Intermittent every 6 hours  betamethasone Injectable 12 milliGRAM(s) IntraMuscular every 24 hours  influenza   Vaccine 0.5 milliLiter(s) IntraMuscular once  lactated ringers. 1000 milliLiter(s) (75 mL/Hr) IV Continuous <Continuous>    MEDICATIONS  (PRN): R3 Antepartum Progress Note - HD#2    Subjective  Patient seen and examined at bedside, no acute overnight events. This AM, no acute complaints. Continues to report loss of clear fluid. Denies CTX, vaginal bleeding, fever, chills, sweats, abdominal pain.     Objective  Vital Signs Last 24 Hours  T(C): 36.8 (10-01-19 @ 03:30), Max: 36.8 (09-30-19 @ 11:35)  HR: 82 (10-01-19 @ 01:33) (81 - 125)  BP: 107/62 (10-01-19 @ 00:48) (92/51 - 123/69)  RR: 20 (09-30-19 @ 14:26) (18 - 20)  SpO2: 98% (10-01-19 @ 01:33) (94% - 100%)    Physical Exam:  General: NAD  CV: RRR, no murmurs, S1, S2  Resp: CTA-B, symmetrical expansino  Abdomen: Soft, non-tender, non-distended, gravid   : clear fluid on pad  Ext: no edema/tenderness in LE B/l    FHR monitoring: baseline 130, mod variability, +10x10 accels, intermittent variable decels    Labs:    Blood Type: A Positive  Antibody Screen: --  RPR: Negative               10.1   10.53 )-----------( 270      ( 09-30 @ 13:00 )             32.6         MEDICATIONS  (STANDING):  amoxicillin 500 milliGRAM(s) Oral every 8 hours  ampicillin  IVPB      ampicillin  IVPB 2 Gram(s) IV Intermittent every 6 hours  betamethasone Injectable 12 milliGRAM(s) IntraMuscular every 24 hours  influenza   Vaccine 0.5 milliLiter(s) IntraMuscular once  lactated ringers. 1000 milliLiter(s) (75 mL/Hr) IV Continuous <Continuous>    MEDICATIONS  (PRN):

## 2019-10-02 DIAGNOSIS — O42.10 PREMATURE RUPTURE OF MEMBRANES, ONSET OF LABOR MORE THAN 24 HOURS FOLLOWING RUPTURE, UNSPECIFIED WEEKS OF GESTATION: ICD-10-CM

## 2019-10-02 LAB
HBV SURFACE AB SER-ACNC: REACTIVE — SIGNIFICANT CHANGE UP
RUBV IGG SER-ACNC: 2.4 INDEX — SIGNIFICANT CHANGE UP
RUBV IGG SER-IMP: POSITIVE — SIGNIFICANT CHANGE UP
T PALLIDUM AB TITR SER: NEGATIVE — SIGNIFICANT CHANGE UP

## 2019-10-02 RX ORDER — AMOXICILLIN 250 MG/5ML
500 SUSPENSION, RECONSTITUTED, ORAL (ML) ORAL EVERY 8 HOURS
Refills: 0 | Status: COMPLETED | OUTPATIENT
Start: 2019-10-02 | End: 2019-10-07

## 2019-10-02 RX ORDER — HEPARIN SODIUM 5000 [USP'U]/ML
5000 INJECTION INTRAVENOUS; SUBCUTANEOUS EVERY 12 HOURS
Refills: 0 | Status: DISCONTINUED | OUTPATIENT
Start: 2019-10-02 | End: 2019-10-19

## 2019-10-02 RX ADMIN — HEPARIN SODIUM 5000 UNIT(S): 5000 INJECTION INTRAVENOUS; SUBCUTANEOUS at 17:24

## 2019-10-02 RX ADMIN — Medication 216 GRAM(S): at 07:59

## 2019-10-02 RX ADMIN — Medication 1 TABLET(S): at 13:03

## 2019-10-02 RX ADMIN — Medication 500 MILLIGRAM(S): at 14:05

## 2019-10-02 RX ADMIN — Medication 216 GRAM(S): at 02:19

## 2019-10-02 RX ADMIN — Medication 500 MILLIGRAM(S): at 22:33

## 2019-10-02 NOTE — PROGRESS NOTE ADULT - SUBJECTIVE AND OBJECTIVE BOX
R3 Note  HD#3    INTERVAL HPI/OVERNIGHT EVENTS: Pt seen and examined at bedside.  Pt without complaints.  Ambulating, passing flatus, tolerating regular diet, pain controlled with analgesia, urinating spontaneously  She denies nausea/vomiting/fever/chills/chest pain/SOB/dizziness.    MEDICATIONS  (STANDING):  amoxicillin 500 milliGRAM(s) Oral every 8 hours  ampicillin  IVPB 2 Gram(s) IV Intermittent every 6 hours  influenza   Vaccine 0.5 milliLiter(s) IntraMuscular once  prenatal multivitamin 1 Tablet(s) Oral daily    MEDICATIONS  (PRN):      12 point ROS negative except as outlined above    Vital Signs Last 24 Hrs  T(C): 36.8 (02 Oct 2019 10:02), Max: 36.9 (01 Oct 2019 14:43)  T(F): 98.2 (02 Oct 2019 10:02), Max: 98.4 (01 Oct 2019 14:43)  HR: 82 (02 Oct 2019 10:02) (62 - 91)  BP: 111/57 (02 Oct 2019 10:02) (80/43 - 111/57)  BP(mean): --  RR: 17 (02 Oct 2019 10:02) (16 - 18)  SpO2: 98% (02 Oct 2019 10:02) (97% - 99%)    I&O's Summary    01 Oct 2019 07:01  -  02 Oct 2019 07:00  --------------------------------------------------------  IN: 100 mL / OUT: 0 mL / NET: 100 mL          PHYSICAL EXAM:    GA: NAD, A+0 x 3  CV: RRR  Pulm: CTA BL  Abd: soft, nontender, nondistended, no rebound or guarding,   : continued leaking of clear fluid  Extremities: no swelling or calf tenderness  Lines:      LABS:                          10.1   10.53 )-----------( 270      ( 30 Sep 2019 13:00 )             32.6   baso 0.2    eos 0.6    imm gran 1.0    lymph 14.6   mono 7.6    poly 76.0                   RADIOLOGY & ADDITIONAL TESTS:

## 2019-10-02 NOTE — PROGRESS NOTE ADULT - SUBJECTIVE AND OBJECTIVE BOX
Patient is a 34y old  Female who presents with a chief complaint of PPROM (01 Oct 2019 05:22)      HPI:   patient is a 33 y/o EDC 2020 EGA 25 reports of leaking water 2019 at 1600. Denies cramping, contractions. Reports of fetal movement. Patient was seen in the office today. Office report indicates Nitrazine positive, pooling of amniotic fluid. Sono indicates fetus is breech presentation, vasa previa,  gram    AP complication: head measurements are small as per patient  Medical History: Denies  Surgical History: LEEP, Appendectomy 16 y/o ago, 2019 D&C x 1 for heavy bleeding  OBGYN History:   -2002  Female 6.9  -SAB x1  -history of chlamydia (30 Sep 2019 12:40)      PAST MEDICAL & SURGICAL HISTORY:  Normal vaginal delivery: 2002-  No pertinent past medical history  History of dilatation and curettage  H/O LEEP: 2019  History of appendectomy      MEDICATIONS  (STANDING):  amoxicillin 500 milliGRAM(s) Oral every 8 hours  heparin  Injectable 5000 Unit(s) SubCutaneous every 12 hours  influenza   Vaccine 0.5 milliLiter(s) IntraMuscular once  prenatal multivitamin 1 Tablet(s) Oral daily              Vital Signs Last 24 Hrs  T(C): 36.8 (02 Oct 2019 10:02), Max: 36.9 (01 Oct 2019 14:43)  T(F): 98.2 (02 Oct 2019 10:02), Max: 98.4 (01 Oct 2019 14:43)  HR: 82 (02 Oct 2019 10:02) (62 - 91)  BP: 111/57 (02 Oct 2019 10:02) (80/43 - 111/57)  BP(mean): --  RR: 17 (02 Oct 2019 10:02) (16 - 18)  SpO2: 98% (02 Oct 2019 10:02) (97% - 99%)    PHYSICAL EXAM:  No contractions  No bleeding  No signs of infection  FHR Tracing was normal category I for gestational age    Will continue antibiotics and try to prolong pregnancy

## 2019-10-02 NOTE — PROGRESS NOTE ADULT - SUBJECTIVE AND OBJECTIVE BOX
Patient examined at bedside. No acute events overnight. Denies contractions, vaginal bleeding and reports continued leakage of fluid.     Vital Signs Last 24 Hours  T(C): 36.6 (10-02-19 @ 14:09), Max: 36.9 (10-01-19 @ 14:43)  HR: 87 (10-02-19 @ 14:09) (62 - 91)  BP: 112/60 (10-02-19 @ 14:09) (80/43 - 112/60)  RR: 17 (10-02-19 @ 14:09) (16 - 18)  SpO2: 99% (10-02-19 @ 14:09) (97% - 99%)      Labs:                        10.1   10.53 )-----------( 270      ( 30 Sep 2019 13:00 )

## 2019-10-02 NOTE — PROGRESS NOTE ADULT - ASSESSMENT
Assessment: 34 yr  @ 25w2d with premature rupture of membranes, afebrile, no evidence of chorioamniotitis. Cerclage in situ.  s/p Betamethasone -10/1  Currently on Latency antibiotics   Fetus with pericardial effusion noted confirmed on fetal echocardiogram   Malpresentation     Plan:   Continue latency antibiotics and monitor for signs and symptoms of  labor, chorioamniotitis, and placenta abruption.  Will continue NST BID and 2 x weekly BPP   If evidence of non-reassuring fetal status will need to proceed with delivery. Magnesium sulfate to be administered prior to delivery for neuroprotection     DVT: SCDs and Heparin subq  Omar Harrell MD   Maternal Fetal Medicine Fellow

## 2019-10-03 ENCOUNTER — APPOINTMENT (OUTPATIENT)
Dept: ANTEPARTUM | Facility: HOSPITAL | Age: 34
End: 2019-10-03
Payer: COMMERCIAL

## 2019-10-03 ENCOUNTER — ASOB RESULT (OUTPATIENT)
Age: 34
End: 2019-10-03

## 2019-10-03 ENCOUNTER — OUTPATIENT (OUTPATIENT)
Dept: OUTPATIENT SERVICES | Facility: HOSPITAL | Age: 34
LOS: 1 days | End: 2019-10-03
Payer: COMMERCIAL

## 2019-10-03 DIAGNOSIS — Z90.49 ACQUIRED ABSENCE OF OTHER SPECIFIED PARTS OF DIGESTIVE TRACT: Chronic | ICD-10-CM

## 2019-10-03 DIAGNOSIS — Z98.890 OTHER SPECIFIED POSTPROCEDURAL STATES: Chronic | ICD-10-CM

## 2019-10-03 PROCEDURE — 76815 OB US LIMITED FETUS(S): CPT | Mod: 26

## 2019-10-03 PROCEDURE — 76820 UMBILICAL ARTERY ECHO: CPT | Mod: 26

## 2019-10-03 PROCEDURE — 99201 OFFICE OUTPATIENT NEW 10 MINUTES: CPT

## 2019-10-03 RX ORDER — SODIUM CHLORIDE 9 MG/ML
1000 INJECTION, SOLUTION INTRAVENOUS ONCE
Refills: 0 | Status: COMPLETED | OUTPATIENT
Start: 2019-10-03 | End: 2019-10-03

## 2019-10-03 RX ADMIN — Medication 500 MILLIGRAM(S): at 23:02

## 2019-10-03 RX ADMIN — Medication 500 MILLIGRAM(S): at 13:49

## 2019-10-03 RX ADMIN — HEPARIN SODIUM 5000 UNIT(S): 5000 INJECTION INTRAVENOUS; SUBCUTANEOUS at 06:24

## 2019-10-03 RX ADMIN — Medication 1 TABLET(S): at 12:05

## 2019-10-03 RX ADMIN — Medication 500 MILLIGRAM(S): at 06:25

## 2019-10-03 RX ADMIN — SODIUM CHLORIDE 1000 MILLILITER(S): 9 INJECTION, SOLUTION INTRAVENOUS at 12:05

## 2019-10-03 RX ADMIN — HEPARIN SODIUM 5000 UNIT(S): 5000 INJECTION INTRAVENOUS; SUBCUTANEOUS at 17:41

## 2019-10-03 NOTE — PROGRESS NOTE ADULT - SUBJECTIVE AND OBJECTIVE BOX
M Progress Note     Patient seen and examined at bedside, no acute overnight events. Denies contractions, vaginal bleeding, and reports continued leakage of fluid and fetal movement.     Vital Signs Last 24 Hours  T(C): 36.8 (10-03-19 @ 05:23), Max: 37.1 (10-03-19 @ 01:35)  HR: 78 (10-03-19 @ 05:23) (68 - 87)  BP: 96/53 (10-03-19 @ 05:23) (86/47 - 112/60)  RR: 17 (10-03-19 @ 05:23) (16 - 17)  SpO2: 100% (10-03-19 @ 05:23) (96% - 100%)    Objective:   Gen: NAD   Abd: no fundal tenderness   Ext: non tender, no edema    Labs:                        10.1   10.53 )-----------( 270      ( 30 Sep 2019 13:00 )             32.6         NST reactive

## 2019-10-03 NOTE — PROGRESS NOTE ADULT - SUBJECTIVE AND OBJECTIVE BOX
Patient seen and examined at bedside, no acute overnight events. No acute complaints. Patient endorses good fetal movement. Patient is ambulating and tolerating regular diet. Denies CP, SOB, N/V, fevers, chills, or any other concerns.    Vital Signs Last 24 Hours  T(C): 36.8 (10-03-19 @ 05:23), Max: 37.1 (10-03-19 @ 01:35)  HR: 78 (10-03-19 @ 05:23) (68 - 87)  BP: 96/53 (10-03-19 @ 05:23) (86/47 - 112/60)  RR: 17 (10-03-19 @ 05:23) (16 - 17)  SpO2: 100% (10-03-19 @ 05:23) (96% - 100%)    I&O's Summary      Physical Exam:  General: NAD  CV: RR  Lungs: breathing comfortably on RA  Abdomen: soft, gravid, non-tender  SVE:   Ext: no pain or swelling    EFM: baseline , mod rosendo, +accels, -decels  [place]: qmin    Labs:             10.1<L>  10.53<H> )-----------( 270      ( 09-30 @ 13:00 )             32.6<L>        MEDICATIONS  (STANDING):  amoxicillin 500 milliGRAM(s) Oral every 8 hours  heparin  Injectable 5000 Unit(s) SubCutaneous every 12 hours  influenza   Vaccine 0.5 milliLiter(s) IntraMuscular once  prenatal multivitamin 1 Tablet(s) Oral daily    MEDICATIONS  (PRN): Patient seen and examined at bedside, no acute overnight events. No acute complaints. Patient endorses good fetal movement. Pt has small amount of clear fluid on pad. Patient is ambulating and tolerating regular diet. Denies CP, SOB, N/V, fevers, chills, or any other concerns.    Vital Signs Last 24 Hours  T(C): 36.8 (10-03-19 @ 05:23), Max: 37.1 (10-03-19 @ 01:35)  HR: 78 (10-03-19 @ 05:23) (68 - 87)  BP: 96/53 (10-03-19 @ 05:23) (86/47 - 112/60)  RR: 17 (10-03-19 @ 05:23) (16 - 17)  SpO2: 100% (10-03-19 @ 05:23) (96% - 100%)    I&O's Summary      Physical Exam:  General: NAD  CV: RR  Lungs: breathing comfortably on RA  Abdomen: soft, gravid, non-tender  Ext: no pain or swelling    NST: reactive    Labs:             10.1<L>  10.53<H> )-----------( 270      ( 09-30 @ 13:00 )             32.6<L>        MEDICATIONS  (STANDING):  amoxicillin 500 milliGRAM(s) Oral every 8 hours  heparin  Injectable 5000 Unit(s) SubCutaneous every 12 hours  influenza   Vaccine 0.5 milliLiter(s) IntraMuscular once  prenatal multivitamin 1 Tablet(s) Oral daily    MEDICATIONS  (PRN):

## 2019-10-03 NOTE — PROGRESS NOTE ADULT - ASSESSMENT
Assessment: 34 yr  @ 25w3d with premature rupture of membranes, afebrile, no evidence of chorioamnionitis. Cerclage in situ.  s/p Betamethasone -10/1  Continue Latency antibiotics   Fetus with pericardial effusion, confirmed on fetal echocardiogram   Malpresentation     Plan:   Continue latency antibiotics and monitor for signs and symptoms of  labor, chorioamnionitis and placenta abruption.  Will continue NST BID and 2 x weekly BPP, BPP to be completed today   If evidence of non-reassuring fetal status will need to proceed with delivery. Magnesium sulfate to be administered prior to delivery for neuroprotection     DVT: SCDs and Heparin subq  Omar Harrell MD   Maternal Fetal Medicine Fellow

## 2019-10-03 NOTE — PROGRESS NOTE ADULT - SUBJECTIVE AND OBJECTIVE BOX
Patient is a 34y old  Female who presents with a chief complaint of PPROM (03 Oct 2019 08:43)      HPI:   patient is a 35 y/o EDC 2020 EGA 25 reports of leaking water 2019 at 1600. Denies cramping, contractions. Reports of fetal movement. Patient was seen in the office today. Office report indicates Nitrazine positive, pooling of amniotic fluid. Sono indicates fetus is breech presentation, vasa previa,  gram    AP complication: head measurements are small as per patient  Medical History: Denies  Surgical History: LEEP, Appendectomy 16 y/o ago, 2019 D&C x 1 for heavy bleeding  OBGYN History:   -2002  Female 6.9  -SAB x1  -history of chlamydia (30 Sep 2019 12:40)      PAST MEDICAL & SURGICAL HISTORY:  Normal vaginal delivery: 2002-  No pertinent past medical history  History of dilatation and curettage  H/O LEEP: 2019  History of appendectomy      MEDICATIONS  (STANDING):  amoxicillin 500 milliGRAM(s) Oral every 8 hours  heparin  Injectable 5000 Unit(s) SubCutaneous every 12 hours  influenza   Vaccine 0.5 milliLiter(s) IntraMuscular once  prenatal multivitamin 1 Tablet(s) Oral daily              Vital Signs Last 24 Hrs  T(C): 36.7 (03 Oct 2019 09:02), Max: 37.1 (03 Oct 2019 01:35)  T(F): 98.06 (03 Oct 2019 09:02), Max: 98.7 (03 Oct 2019 01:35)  HR: 81 (03 Oct 2019 09:11) (68 - 87)  BP: 107/56 (03 Oct 2019 09:11) (86/47 - 112/60)  BP(mean): --  RR: 17 (03 Oct 2019 05:23) (16 - 17)  SpO2: 98% (03 Oct 2019 09:11) (96% - 100%)    PHYSICAL EXAM:    No pain tenderness or contractions  FHR Tracing remains category I.    Will continue conservative treatment.

## 2019-10-04 LAB
BLD GP AB SCN SERPL QL: NEGATIVE — SIGNIFICANT CHANGE UP
RH IG SCN BLD-IMP: POSITIVE — SIGNIFICANT CHANGE UP

## 2019-10-04 RX ADMIN — Medication 500 MILLIGRAM(S): at 22:32

## 2019-10-04 RX ADMIN — Medication 500 MILLIGRAM(S): at 05:41

## 2019-10-04 RX ADMIN — HEPARIN SODIUM 5000 UNIT(S): 5000 INJECTION INTRAVENOUS; SUBCUTANEOUS at 05:41

## 2019-10-04 RX ADMIN — Medication 1 TABLET(S): at 11:59

## 2019-10-04 RX ADMIN — HEPARIN SODIUM 5000 UNIT(S): 5000 INJECTION INTRAVENOUS; SUBCUTANEOUS at 18:48

## 2019-10-04 RX ADMIN — Medication 500 MILLIGRAM(S): at 14:05

## 2019-10-04 NOTE — PROGRESS NOTE ADULT - SUBJECTIVE AND OBJECTIVE BOX
Patient is a 34y old  Female who presents with a chief complaint of PPROM (04 Oct 2019 10:39)      HPI:   patient is a 35 y/o EDC 2020 EGA 25 reports of leaking water 2019 at 1600. Denies cramping, contractions. Reports of fetal movement. Patient was seen in the office today. Office report indicates Nitrazine positive, pooling of amniotic fluid. Sono indicates fetus is breech presentation, vasa previa,  gram    AP complication: head measurements are small as per patient  Medical History: Denies  Surgical History: LEEP, Appendectomy 16 y/o ago, 2019 D&C x 1 for heavy bleeding  OBGYN History:   -2002  Female 6.9  -SAB x1  -history of chlamydia (30 Sep 2019 12:40)      PAST MEDICAL & SURGICAL HISTORY:  Normal vaginal delivery: 2002-  No pertinent past medical history  History of dilatation and curettage  H/O LEEP: 2019  History of appendectomy      MEDICATIONS  (STANDING):  amoxicillin 500 milliGRAM(s) Oral every 8 hours  heparin  Injectable 5000 Unit(s) SubCutaneous every 12 hours  influenza   Vaccine 0.5 milliLiter(s) IntraMuscular once  prenatal multivitamin 1 Tablet(s) Oral daily              Vital Signs Last 24 Hrs  T(C): 37.1 (04 Oct 2019 10:55), Max: 37.1 (04 Oct 2019 10:55)  T(F): 98.8 (04 Oct 2019 10:55), Max: 98.8 (04 Oct 2019 10:55)  HR: 80 (04 Oct 2019 05:40) (63 - 88)  BP: 106/57 (04 Oct 2019 05:40) (106/57 - 111/62)  BP(mean): --  RR: 16 (04 Oct 2019 05:40) (15 - 18)  SpO2: 99% (04 Oct 2019 05:40) (96% - 100%)    PHYSICAL EXAM:    No contractions or pain  FHR tracing with moderate variability, occasional variable decelerations mild.  Will continue current treatment and follow up with daily monitoring

## 2019-10-04 NOTE — PROGRESS NOTE ADULT - SUBJECTIVE AND OBJECTIVE BOX
MFM Fellow Progress Note     Patient denies contractions, vaginal bleeding, and reports positive fetal movement. No complaints today.     Vital Signs Last 24 Hours  T(C): 36.7 (10-04-19 @ 05:40), Max: 37 (10-03-19 @ 22:26)  HR: 80 (10-04-19 @ 05:40) (63 - 88)  BP: 106/57 (10-04-19 @ 05:40) (106/57 - 111/62)  RR: 16 (10-04-19 @ 05:40) (15 - 18)  SpO2: 99% (10-04-19 @ 05:40) (96% - 100%)        MEDICATIONS  (STANDING):  amoxicillin 500 milliGRAM(s) Oral every 8 hours  heparin  Injectable 5000 Unit(s) SubCutaneous every 12 hours  influenza   Vaccine 0.5 milliLiter(s) IntraMuscular once  prenatal multivitamin 1 Tablet(s) Oral daily

## 2019-10-04 NOTE — PROGRESS NOTE ADULT - ASSESSMENT
Assessment: 34 yr  @ 25w4d with premature rupture of membranes, afebrile, no evidence of chorioamnionitis. Cerclage in situ.  s/p Betamethasone -10/1  Continue Latency antibiotics   Fetus with pericardial effusion, confirmed on fetal echocardiogram   Malpresentation - complete breech, MPV 3.14 cm     Plan:   Continue latency antibiotics and monitor for signs and symptoms of  labor, chorioamnionitis and placenta abruption.  Will continue NST BID x 2 hours, and BPP 2x weekly   If evidence of non-reassuring fetal status will need to proceed with delivery. Magnesium sulfate to be administered prior to delivery for neuroprotection   DVT: SCDs and Heparin subq    Omar Harrell MD   Maternal Fetal Medicine Fellow No

## 2019-10-04 NOTE — PROGRESS NOTE ADULT - SUBJECTIVE AND OBJECTIVE BOX
Writer called patient. Patient states she is seeing DR Lei now. She will ask her to refill the medication.    Patient seen and examined at bedside, no acute overnight events. No acute complaints. Patient endorses good fetal movement. Patient is ambulating and tolerating regular diet. Denies CP, SOB, N/V, fevers, chills, or any other concerns.    Vital Signs Last 24 Hours  T(C): 36.7 (10-04-19 @ 05:40), Max: 37 (10-03-19 @ 22:26)  HR: 80 (10-04-19 @ 05:40) (63 - 88)  BP: 106/57 (10-04-19 @ 05:40) (106/57 - 111/62)  RR: 16 (10-04-19 @ 05:40) (15 - 18)  SpO2: 99% (10-04-19 @ 05:40) (96% - 100%)    I&O's Summary      Physical Exam:  General: NAD  CV: RR  Lungs: breathing comfortably on RA  Abdomen: soft, gravid, non-tender  SVE:   Ext: no pain or swelling    EFM: baseline , mod rosendo, +accels, -decels  [place]: qmin    Labs:             10.1<L>  10.53<H> )-----------( 270      ( 09-30 @ 13:00 )             32.6<L>        MEDICATIONS  (STANDING):  amoxicillin 500 milliGRAM(s) Oral every 8 hours  heparin  Injectable 5000 Unit(s) SubCutaneous every 12 hours  influenza   Vaccine 0.5 milliLiter(s) IntraMuscular once  prenatal multivitamin 1 Tablet(s) Oral daily    MEDICATIONS  (PRN): Patient seen and examined at bedside, no acute overnight events. No acute complaints. Patient endorses good fetal movement. Patient is ambulating and tolerating regular diet. Denies CP, SOB, N/V, fevers, chills, or any other concerns.    Vital Signs Last 24 Hours  T(C): 36.7 (10-04-19 @ 05:40), Max: 37 (10-03-19 @ 22:26)  HR: 80 (10-04-19 @ 05:40) (63 - 88)  BP: 106/57 (10-04-19 @ 05:40) (106/57 - 111/62)  RR: 16 (10-04-19 @ 05:40) (15 - 18)  SpO2: 99% (10-04-19 @ 05:40) (96% - 100%)    I&O's Summary      Physical Exam:  General: NAD  CV: RR  Lungs: breathing comfortably on RA  Abdomen: soft, gravid, non-tender  Ext: no pain or swelling    NST: reactive    Labs:             10.1<L>  10.53<H> )-----------( 270      ( 09-30 @ 13:00 )             32.6<L>        MEDICATIONS  (STANDING):  amoxicillin 500 milliGRAM(s) Oral every 8 hours  heparin  Injectable 5000 Unit(s) SubCutaneous every 12 hours  influenza   Vaccine 0.5 milliLiter(s) IntraMuscular once  prenatal multivitamin 1 Tablet(s) Oral daily    MEDICATIONS  (PRN):

## 2019-10-05 PROCEDURE — 99233 SBSQ HOSP IP/OBS HIGH 50: CPT | Mod: GC

## 2019-10-05 RX ADMIN — HEPARIN SODIUM 5000 UNIT(S): 5000 INJECTION INTRAVENOUS; SUBCUTANEOUS at 05:48

## 2019-10-05 RX ADMIN — Medication 1 TABLET(S): at 11:50

## 2019-10-05 RX ADMIN — HEPARIN SODIUM 5000 UNIT(S): 5000 INJECTION INTRAVENOUS; SUBCUTANEOUS at 18:01

## 2019-10-05 RX ADMIN — Medication 500 MILLIGRAM(S): at 05:48

## 2019-10-05 RX ADMIN — Medication 500 MILLIGRAM(S): at 22:46

## 2019-10-05 RX ADMIN — Medication 500 MILLIGRAM(S): at 14:07

## 2019-10-05 NOTE — PROGRESS NOTE ADULT - ASSESSMENT
Assessment: 34 yr  @ 25w5d with premature rupture of membranes, afebrile, no evidence of chorioamnionitis. Cerclage in situ.  s/p Betamethasone -10/1  Continue Latency antibiotics   Fetus with pericardial effusion, confirmed on fetal echocardiogram  Malpresentation - complete breech    Plan    Continue latency antibiotics and monitor for signs and symptoms of  labor, chorioamnionitis and placental abruption.  Will continue NST BID x 2 hours, and BPP twice weekly  If evidence of non-reassuring fetal status will need to proceed with delivery. Cerclage to be removed if concern for cervical dilation.  Magnesium sulfate to be administered prior to delivery for neuroprotection   DVT: SCDs and Heparin subq    Omar Harrell MD   Maternal Fetal Medicine Fellow

## 2019-10-05 NOTE — PROGRESS NOTE ADULT - ASSESSMENT
33 yo  25w5d a/w PPROM at 24w5d. Antepartum course complicated by cervical insufficiency with cerclage in place

## 2019-10-05 NOTE — PROGRESS NOTE ADULT - SUBJECTIVE AND OBJECTIVE BOX
MFM Fellow Progress Note     Patient examined at bedside. Denies contractions, vaginal bleeding, and reports positive fetal movement. Continues to have small amount of leakage but otherwise no complaints       Vital Signs Last 24 Hours  T(C): 36.7 (10-05-19 @ 09:33), Max: 36.8 (10-04-19 @ 14:03)  HR: 88 (10-05-19 @ 08:04) (80 - 90)  BP: 111/55 (10-05-19 @ 08:04) (97/53 - 118/61)  RR: 17 (10-05-19 @ 05:39) (17 - 19)  SpO2: 97% (10-05-19 @ 05:39) (97% - 99%)      NST reative

## 2019-10-05 NOTE — PROGRESS NOTE ADULT - SUBJECTIVE AND OBJECTIVE BOX
R3 Antepartum Progress Note - HD#6    Subjective  Patient seen and examined at bedside, no acute overnight events. This AM, patient reports no acute complaints. Reports good fetal movement. Patient reports loss of clear amniotic fluid. Denies CTX, VB, fever, chills, sweats, CP, SOB, palpitations, LE edema tenderness.    Objective  Vital Signs Last 24 Hours  T(C): 36.3 (10-05-19 @ 01:33), Max: 37.1 (10-04-19 @ 10:55)  HR: 82 (10-04-19 @ 22:14) (78 - 90)  BP: 100/55 (10-04-19 @ 22:14) (100/55 - 118/61)  RR: 18 (10-04-19 @ 22:14) (16 - 19)  SpO2: 97% (10-04-19 @ 22:14) (97% - 99%)    Physical Exam:  General: NAD  CV: RRR, no murmurs, S1, S2  Resp: CTA-B, symmetrical expansion  Abdomen: Soft, non-tender, non-distended, gravid  Ext: no edema/tenderness in LE b/l    NST overnight: 140 baseline, mod variability, + 10x10 accels, occasional variable decels    Labs:    Blood Type: A Positive  Antibody Screen: Negative  Rubella IgG: Positive (Positive=Immune, Negative=Non-Immune)  RPR: Negative               10.1   10.53 )-----------( 270      ( 09-30 @ 13:00 )             32.6         MEDICATIONS  (STANDING):  amoxicillin 500 milliGRAM(s) Oral every 8 hours  heparin  Injectable 5000 Unit(s) SubCutaneous every 12 hours  influenza   Vaccine 0.5 milliLiter(s) IntraMuscular once  prenatal multivitamin 1 Tablet(s) Oral daily    MEDICATIONS  (PRN):

## 2019-10-05 NOTE — PROGRESS NOTE ADULT - ATTENDING COMMENTS
MFM attg  Pt seen by me at bedside, no signs labor or infection  NST reactive, no ctxs  Pt understands plan of care

## 2019-10-06 LAB — SPECIMEN SOURCE: SIGNIFICANT CHANGE UP

## 2019-10-06 PROCEDURE — 99232 SBSQ HOSP IP/OBS MODERATE 35: CPT | Mod: GC

## 2019-10-06 RX ADMIN — Medication 500 MILLIGRAM(S): at 06:21

## 2019-10-06 RX ADMIN — Medication 500 MILLIGRAM(S): at 14:51

## 2019-10-06 RX ADMIN — Medication 1 TABLET(S): at 12:00

## 2019-10-06 RX ADMIN — HEPARIN SODIUM 5000 UNIT(S): 5000 INJECTION INTRAVENOUS; SUBCUTANEOUS at 18:06

## 2019-10-06 RX ADMIN — HEPARIN SODIUM 5000 UNIT(S): 5000 INJECTION INTRAVENOUS; SUBCUTANEOUS at 06:21

## 2019-10-06 RX ADMIN — Medication 500 MILLIGRAM(S): at 22:46

## 2019-10-06 NOTE — PROGRESS NOTE ADULT - ASSESSMENT
33 yo  25w6d a/w PPROM at 24w5d. Antepartum course complicated by cervical insufficiency with cerclage in place

## 2019-10-06 NOTE — PROGRESS NOTE ADULT - SUBJECTIVE AND OBJECTIVE BOX
R3 Antepartum Progress Note - HD#7    Subjective  Patient seen and examined at bedside, no acute overnight events. This AM, patient reports no acute complaints. Reports good fetal movement. Patient reports loss of clear amniotic fluid. Denies CTX, VB, fever, chills, sweats, CP, SOB, palpitations, LE edema tenderness.    Objective  Vital Signs Last 24 Hours  Vital Signs Last 24 Hrs  T(C): 36.6 (06 Oct 2019 01:42), Max: 36.8 (05 Oct 2019 05:39)  T(F): 97.8 (06 Oct 2019 01:42), Max: 98.2 (05 Oct 2019 05:39)  HR: 81 (05 Oct 2019 22:30) (76 - 100)  BP: 109/58 (05 Oct 2019 22:30) (97/53 - 117/71)  BP(mean): --  RR: 17 (05 Oct 2019 22:30) (17 - 18)  SpO2: 99% (05 Oct 2019 22:30) (97% - 99%)    Physical Exam:  General: NAD  CV: RRR, no murmurs, S1, S2  Resp: CTA-B, symmetrical expansion  Abdomen: Soft, non-tender, non-distended, gravid  Ext: no edema/tenderness in LE b/l    NST overnight: 140 baseline, mod variability, + 10x10 accels, occasional variable decels    Labs:    Blood Type: A Positive  Antibody Screen: Negative  Rubella IgG: Positive (Positive=Immune, Negative=Non-Immune)  RPR: Negative               10.1   10.53 )-----------( 270      ( 09-30 @ 13:00 )             32.6         MEDICATIONS  (STANDING):  amoxicillin 500 milliGRAM(s) Oral every 8 hours  heparin  Injectable 5000 Unit(s) SubCutaneous every 12 hours  influenza   Vaccine 0.5 milliLiter(s) IntraMuscular once  prenatal multivitamin 1 Tablet(s) Oral daily    MEDICATIONS  (PRN):

## 2019-10-07 ENCOUNTER — APPOINTMENT (OUTPATIENT)
Dept: ANTEPARTUM | Facility: HOSPITAL | Age: 34
End: 2019-10-07
Payer: COMMERCIAL

## 2019-10-07 ENCOUNTER — ASOB RESULT (OUTPATIENT)
Age: 34
End: 2019-10-07

## 2019-10-07 ENCOUNTER — OUTPATIENT (OUTPATIENT)
Dept: OUTPATIENT SERVICES | Facility: HOSPITAL | Age: 34
LOS: 1 days | End: 2019-10-07

## 2019-10-07 DIAGNOSIS — Z90.49 ACQUIRED ABSENCE OF OTHER SPECIFIED PARTS OF DIGESTIVE TRACT: Chronic | ICD-10-CM

## 2019-10-07 DIAGNOSIS — Z98.890 OTHER SPECIFIED POSTPROCEDURAL STATES: Chronic | ICD-10-CM

## 2019-10-07 LAB — GP B STREP GENITAL QL CULT: SIGNIFICANT CHANGE UP

## 2019-10-07 PROCEDURE — 99232 SBSQ HOSP IP/OBS MODERATE 35: CPT | Mod: GC

## 2019-10-07 PROCEDURE — 76815 OB US LIMITED FETUS(S): CPT | Mod: 26

## 2019-10-07 PROCEDURE — 76820 UMBILICAL ARTERY ECHO: CPT | Mod: 26

## 2019-10-07 RX ADMIN — HEPARIN SODIUM 5000 UNIT(S): 5000 INJECTION INTRAVENOUS; SUBCUTANEOUS at 18:49

## 2019-10-07 RX ADMIN — HEPARIN SODIUM 5000 UNIT(S): 5000 INJECTION INTRAVENOUS; SUBCUTANEOUS at 05:58

## 2019-10-07 RX ADMIN — Medication 1 TABLET(S): at 15:13

## 2019-10-07 RX ADMIN — Medication 500 MILLIGRAM(S): at 05:58

## 2019-10-07 NOTE — PROGRESS NOTE ADULT - SUBJECTIVE AND OBJECTIVE BOX
R3 Note  HD#8    INTERVAL HPI/OVERNIGHT EVENTS: Pt seen and examined at bedside.  Pt without complains.  Ambulating, passing flatus, tolerating regular diet, pain controlled with analgesia, urinating spontaneously  She denies nausea/vomiting/fever/chills/chest pain/SOB/dizziness.    MEDICATIONS  (STANDING):  heparin  Injectable 5000 Unit(s) SubCutaneous every 12 hours  influenza   Vaccine 0.5 milliLiter(s) IntraMuscular once  prenatal multivitamin 1 Tablet(s) Oral daily    MEDICATIONS  (PRN):      12 point ROS negative except as outlined above    Vital Signs Last 24 Hrs  T(C): 36.6 (07 Oct 2019 14:21), Max: 36.9 (07 Oct 2019 05:55)  T(F): 97.8 (07 Oct 2019 14:21), Max: 98.4 (07 Oct 2019 05:55)  HR: 108 (07 Oct 2019 14:21) (78 - 108)  BP: 117/65 (07 Oct 2019 14:21) (98/55 - 117/65)  BP(mean): --  RR: 17 (07 Oct 2019 14:21) (16 - 18)  SpO2: 98% (07 Oct 2019 14:21) (96% - 98%)    I&O's Summary        PHYSICAL EXAM:    GA: NAD, A+0 x 3  CV: RRR  Pulm: CTA BL  Abd: soft, nontender, nondistended, no rebound or guarding,   : lochia WNL  Extremities: no swelling or calf tenderness  Lines:      LABS:                    RADIOLOGY & ADDITIONAL TESTS:

## 2019-10-07 NOTE — PROGRESS NOTE ADULT - ASSESSMENT
35 yo  26w0d a/w PPROM at 24w5d. Antepartum course complicated by cervical insufficiency with cerclage in place

## 2019-10-07 NOTE — DIETITIAN INITIAL EVALUATION ADULT. - PERTINENT MEDS FT
MEDICATIONS  (STANDING):  heparin  Injectable 5000 Unit(s) SubCutaneous every 12 hours  influenza   Vaccine 0.5 milliLiter(s) IntraMuscular once  prenatal multivitamin 1 Tablet(s) Oral daily

## 2019-10-07 NOTE — DIETITIAN INITIAL EVALUATION ADULT. - OTHER INFO
RD visited with patient for length of stay nutrition assessment. 33 yo  25w6d a/w PPROM at 24w5d. Antepartum course complicated by cervical insufficiency with cerclage in place.   No significant PMH.   Endorses good appetite - no reported nausea/vomiting/diarrhea; reviewed general/healthful eating recommendations in pregnancy.  No questions or concerns at time of visit.  Receives prenatal vitamin one tablet daily.  Weight prior to pregnancy 160 pounds / 72kg.

## 2019-10-07 NOTE — PROGRESS NOTE ADULT - SUBJECTIVE AND OBJECTIVE BOX
Patient is a 34y old  Female who presents with a chief complaint of PPROM (06 Oct 2019 02:27)      HPI:   patient is a 35 y/o EDC 2020 EGA 25 reports of leaking water 2019 at 1600. Denies cramping, contractions. Reports of fetal movement. Patient was seen in the office today. Office report indicates Nitrazine positive, pooling of amniotic fluid. Sono indicates fetus is breech presentation, vasa previa,  gram    AP complication: head measurements are small as per patient  Medical History: Denies  Surgical History: LEEP, Appendectomy 16 y/o ago, 2019 D&C x 1 for heavy bleeding  OBGYN History:   -2002  Female 6.9  -SAB x1  -history of chlamydia (30 Sep 2019 12:40)      PAST MEDICAL & SURGICAL HISTORY:  Normal vaginal delivery: 2002-  No pertinent past medical history  History of dilatation and curettage  H/O LEEP: 2019  History of appendectomy      MEDICATIONS  (STANDING):  heparin  Injectable 5000 Unit(s) SubCutaneous every 12 hours  influenza   Vaccine 0.5 milliLiter(s) IntraMuscular once  prenatal multivitamin 1 Tablet(s) Oral daily              Vital Signs Last 24 Hrs  T(C): 36.6 (07 Oct 2019 14:21), Max: 36.9 (07 Oct 2019 05:55)  T(F): 97.8 (07 Oct 2019 14:21), Max: 98.4 (07 Oct 2019 05:55)  HR: 108 (07 Oct 2019 14:21) (78 - 108)  BP: 117/65 (07 Oct 2019 14:21) (98/55 - 117/65)  BP(mean): --  RR: 17 (07 Oct 2019 14:21) (16 - 18)  SpO2: 98% (07 Oct 2019 14:21) (96% - 98%)    PHYSICAL EXAM:  NO contractions, pain or bleeding  FHR tracing is category I.  Will continue conservative management

## 2019-10-07 NOTE — DIETITIAN INITIAL EVALUATION ADULT. - ADD RECOMMEND
1) Monitor weights, PO intake/diet tolerance, skin integrity, pertinent labs. 1) Monitor weights, PO intake/diet tolerance, pertinent labs. 2) Patient to maintain nutritional intake sufficient to support optimal fetal growth and development.

## 2019-10-08 PROCEDURE — 99232 SBSQ HOSP IP/OBS MODERATE 35: CPT | Mod: GC

## 2019-10-08 RX ADMIN — HEPARIN SODIUM 5000 UNIT(S): 5000 INJECTION INTRAVENOUS; SUBCUTANEOUS at 17:46

## 2019-10-08 RX ADMIN — HEPARIN SODIUM 5000 UNIT(S): 5000 INJECTION INTRAVENOUS; SUBCUTANEOUS at 06:16

## 2019-10-08 RX ADMIN — Medication 1 TABLET(S): at 14:27

## 2019-10-08 NOTE — PROGRESS NOTE ADULT - SUBJECTIVE AND OBJECTIVE BOX
MFM Fellow Progress Note     Patient seen and examined at bedside, no acute events overnight. Denies contractions, vaginal bleeding and reports positive fetal movement and continued leakage of fluid.     Vital Signs Last 24 Hours  T(C): 36.8 (10-08-19 @ 10:16), Max: 36.8 (10-07-19 @ 18:26)  HR: 105 (10-08-19 @ 10:16) (85 - 108)  BP: 116/60 (10-08-19 @ 10:16) (99/60 - 119/60)  RR: 18 (10-08-19 @ 10:16) (16 - 18)  SpO2: 97% (10-08-19 @ 10:16) (92% - 99%)    AM NST reviewed, reactive baseline 145  No contractions on TOCO

## 2019-10-08 NOTE — PROGRESS NOTE ADULT - ASSESSMENT
35 yo  26w1d a/w PPROM at 24w5d. Antepartum course complicated by cervical insufficiency with cerclage in place

## 2019-10-08 NOTE — PROGRESS NOTE ADULT - ASSESSMENT
Assessment:  34 yr  @ 26w1d with premature rupture of membranes, afebrile, no evidence of chorioamnionitis. Cerclage in situ.  s/p Betamethasone -10/1  s/p latency antibiotics   Fetus with pericardial effusion, confirmed on fetal echocardiogram  Malpresentation - complete breech    Plan    Continue to close fetal surveillance.  NST BID x 2 hours, and BPP twice weekly (Mon/Thur)  If evidence of non-reassuring fetal status will need to proceed with delivery. Cerclage to be removed if concern for cervical dilation.  Magnesium sulfate to be administered prior to delivery for neuroprotection   DVT: SCDs and Heparin subq, and ambulation   Seen with Dr. Pryor ([facility] Attending)     Omar Harrell MD   Maternal Fetal Medicine Fellow

## 2019-10-08 NOTE — PROGRESS NOTE ADULT - SUBJECTIVE AND OBJECTIVE BOX
R3 BackNote  HD#9    INTERVAL HPI/OVERNIGHT EVENTS: Pt seen and examined at bedside.  Pt without complaints.  Ambulating, passing flatus, tolerating regular diet, pain controlled with analgesia, urinating spontaneously  She denies nausea/vomiting/fever/chills/chest pain/SOB/dizziness.    MEDICATIONS  (STANDING):  heparin  Injectable 5000 Unit(s) SubCutaneous every 12 hours  influenza   Vaccine 0.5 milliLiter(s) IntraMuscular once  prenatal multivitamin 1 Tablet(s) Oral daily    MEDICATIONS  (PRN):      12 point ROS negative except as outlined above    Vital Signs Last 24 Hrs  T(C): 36.7 (08 Oct 2019 18:12), Max: 36.8 (07 Oct 2019 22:30)  T(F): 98 (08 Oct 2019 18:12), Max: 98.3 (08 Oct 2019 10:16)  HR: 99 (08 Oct 2019 18:12) (85 - 105)  BP: 114/61 (08 Oct 2019 18:12) (99/60 - 116/60)  BP(mean): --  RR: 16 (08 Oct 2019 18:12) (16 - 18)  SpO2: 99% (08 Oct 2019 18:12) (92% - 99%)    I&O's Summary        PHYSICAL EXAM:    GA: NAD, A+0 x 3  CV: RRR  Pulm: CTA BL  Abd: soft, nontender, nondistended, no rebound or guarding,   : lochia WNL  Extremities: no swelling or calf tenderness  Lines:      LABS:                    RADIOLOGY & ADDITIONAL TESTS:

## 2019-10-08 NOTE — PROGRESS NOTE ADULT - SUBJECTIVE AND OBJECTIVE BOX
Patient is a 34y old  Female who presents with a chief complaint of PPROM (06 Oct 2019 02:27)      HPI:   patient is a 33 y/o EDC 2020 EGA 25 reports of leaking water 2019 at 1600. Denies cramping, contractions. Reports of fetal movement. Patient was seen in the office today. Office report indicates Nitrazine positive, pooling of amniotic fluid. Sono indicates fetus is breech presentation, vasa previa,  gram    AP complication: head measurements are small as per patient  Medical History: Denies  Surgical History: LEEP, Appendectomy 16 y/o ago, 2019 D&C x 1 for heavy bleeding  OBGYN History:   -2002  Female 6.9  -SAB x1  -history of chlamydia (30 Sep 2019 12:40)      PAST MEDICAL & SURGICAL HISTORY:  Normal vaginal delivery: 2002-  No pertinent past medical history  History of dilatation and curettage  H/O LEEP: 2019  History of appendectomy      MEDICATIONS  (STANDING):  heparin  Injectable 5000 Unit(s) SubCutaneous every 12 hours  influenza   Vaccine 0.5 milliLiter(s) IntraMuscular once  prenatal multivitamin 1 Tablet(s) Oral daily              Vital Signs Last 24 Hrs  T(C): 36.8 (08 Oct 2019 10:16), Max: 36.8 (07 Oct 2019 18:26)  T(F): 98.3 (08 Oct 2019 10:16), Max: 98.3 (08 Oct 2019 10:16)  HR: 105 (08 Oct 2019 10:16) (85 - 108)  BP: 116/60 (08 Oct 2019 10:16) (99/60 - 119/60)  BP(mean): --  RR: 18 (08 Oct 2019 10:16) (16 - 18)  SpO2: 97% (08 Oct 2019 10:16) (92% - 99%)    PHYSICAL EXAM:    Patient has no complains and feels good fetal movements.  FHR tracing category I  Will continue conservative management in an attempt to prolong pregnany

## 2019-10-09 RX ADMIN — HEPARIN SODIUM 5000 UNIT(S): 5000 INJECTION INTRAVENOUS; SUBCUTANEOUS at 18:44

## 2019-10-09 RX ADMIN — HEPARIN SODIUM 5000 UNIT(S): 5000 INJECTION INTRAVENOUS; SUBCUTANEOUS at 06:20

## 2019-10-09 RX ADMIN — Medication 1 TABLET(S): at 13:13

## 2019-10-09 NOTE — PROGRESS NOTE ADULT - ASSESSMENT
Assessment:  34 yr  @ 26w2d with premature rupture of membranes, afebrile, no evidence of chorioamnionitis. Cerclage in situ.  s/p Betamethasone -10/1  s/p latency antibiotics   Fetus with pericardial effusion, confirmed on fetal echocardiogram  Malpresentation - complete breech    Plan    Continue to close fetal surveillance.  NST BID x 2 hours, and BPP twice weekly (Mon/Thur)  If evidence of non-reassuring fetal status will need to proceed with delivery. Cerclage to be removed if concern for cervical dilation.  Magnesium sulfate to be administered prior to delivery for neuroprotection   DVT: SCDs and Heparin subq, and ambulation     Seen with Dr. Pryor (M Attending)     Omar Harrell MD   Maternal Fetal Medicine Fellow

## 2019-10-09 NOTE — PROGRESS NOTE ADULT - ATTENDING COMMENTS
PAtient seen and examined. Agree with above assessment.  FHR Tracing remains category I with no decellerations and no signs of infection   Will continue conservative management.

## 2019-10-09 NOTE — PROGRESS NOTE ADULT - SUBJECTIVE AND OBJECTIVE BOX
R3 Note  HD#10    INTERVAL HPI/OVERNIGHT EVENTS: Pt seen and examined at bedside.  Pt without complaints.  Ambulating, passing flatus, tolerating regular diet, pain controlled with analgesia, urinating spontaneously  She denies nausea/vomiting/fever/chills/chest pain/SOB/dizziness.    MEDICATIONS  (STANDING):  heparin  Injectable 5000 Unit(s) SubCutaneous every 12 hours  influenza   Vaccine 0.5 milliLiter(s) IntraMuscular once  prenatal multivitamin 1 Tablet(s) Oral daily    MEDICATIONS  (PRN):      12 point ROS negative except as outlined above    Vital Signs Last 24 Hrs  T(C): 36.5 (09 Oct 2019 05:32), Max: 36.8 (08 Oct 2019 10:16)  T(F): 97.7 (09 Oct 2019 05:32), Max: 98.3 (08 Oct 2019 10:16)  HR: 89 (09 Oct 2019 05:32) (89 - 105)  BP: 97/58 (09 Oct 2019 05:32) (97/58 - 116/60)  BP(mean): --  RR: 17 (09 Oct 2019 05:32) (16 - 18)  SpO2: 98% (09 Oct 2019 05:32) (97% - 99%)    I&O's Summary        PHYSICAL EXAM:    GA: NAD, A+0 x 3  CV: RRR  Pulm: CTA BL  Abd: soft, nontender, nondistended, no rebound or guarding,   : lochia WNL  Extremities: no swelling or calf tenderness  Lines:      LABS:                    RADIOLOGY & ADDITIONAL TESTS:

## 2019-10-09 NOTE — PROGRESS NOTE ADULT - SUBJECTIVE AND OBJECTIVE BOX
MFM Fellow Progress Note     Patient evaluated bedside. No complaints. Denies contractions, vaginal bleeding, and reports positive fetal movement.    Vital Signs Last 24 Hours  T(C): 36.4 (10-09-19 @ 13:35), Max: 36.7 (10-08-19 @ 18:12)  HR: 113 (10-09-19 @ 13:35) (50 - 113)  BP: 113/61 (10-09-19 @ 13:35) (97/58 - 114/61)  RR: 18 (10-09-19 @ 13:35) (16 - 18)  SpO2: 99% (10-09-19 @ 13:35) (83% - 99%)    NST reactive in AM

## 2019-10-09 NOTE — PROGRESS NOTE ADULT - ASSESSMENT
33 yo  26w2d a/w PPROM at 24w5d. Antepartum course complicated by cervical insufficiency with cerclage in place

## 2019-10-10 ENCOUNTER — APPOINTMENT (OUTPATIENT)
Dept: ANTEPARTUM | Facility: HOSPITAL | Age: 34
End: 2019-10-10
Payer: COMMERCIAL

## 2019-10-10 ENCOUNTER — ASOB RESULT (OUTPATIENT)
Age: 34
End: 2019-10-10

## 2019-10-10 PROCEDURE — 99233 SBSQ HOSP IP/OBS HIGH 50: CPT | Mod: 25

## 2019-10-10 PROCEDURE — 76819 FETAL BIOPHYS PROFIL W/O NST: CPT | Mod: 26

## 2019-10-10 PROCEDURE — 76820 UMBILICAL ARTERY ECHO: CPT | Mod: 26

## 2019-10-10 RX ADMIN — HEPARIN SODIUM 5000 UNIT(S): 5000 INJECTION INTRAVENOUS; SUBCUTANEOUS at 18:53

## 2019-10-10 RX ADMIN — Medication 1 TABLET(S): at 18:55

## 2019-10-10 RX ADMIN — HEPARIN SODIUM 5000 UNIT(S): 5000 INJECTION INTRAVENOUS; SUBCUTANEOUS at 05:38

## 2019-10-10 NOTE — PROGRESS NOTE ADULT - ASSESSMENT
Assessment:  34 yr  @ 26w3d with premature rupture of membranes, afebrile, no evidence of chorioamnionitis. Cerclage in situ.  s/p Betamethasone -10/1  s/p latency antibiotics   Fetus with known pericardial effusion  Malpresentation - complete breech    Plan    Continue to close fetal surveillance.  NST BID x 2 hours, and BPP twice weekly (Mon/Th)  If evidence of non-reassuring fetal status will need to proceed with delivery. Cerclage to be removed if concern for cervical dilation.  Magnesium sulfate to be administered prior to delivery for neuroprotection   Continue to monitor for  labor, chorioamnionitis and placental abruption   DVT: SCDs and Heparin subq, and ambulation       Omar Harrell MD   Maternal Fetal Medicine Fellow

## 2019-10-10 NOTE — PROGRESS NOTE ADULT - SUBJECTIVE AND OBJECTIVE BOX
R3 Backnote  HD#11    INTERVAL HPI/OVERNIGHT EVENTS: Pt seen and examined at bedside.  Pt without complaints.  Ambulating, passing flatus, tolerating regular diet, pain controlled with analgesia, urinating spontaneously  She denies nausea/vomiting/fever/chills/chest pain/SOB/dizziness.    MEDICATIONS  (STANDING):  heparin  Injectable 5000 Unit(s) SubCutaneous every 12 hours  influenza   Vaccine 0.5 milliLiter(s) IntraMuscular once  prenatal multivitamin 1 Tablet(s) Oral daily    MEDICATIONS  (PRN):      12 point ROS negative except as outlined above    Vital Signs Last 24 Hrs  T(C): 36.6 (10 Oct 2019 18:12), Max: 36.8 (10 Oct 2019 01:53)  T(F): 97.8 (10 Oct 2019 18:12), Max: 98.3 (10 Oct 2019 01:53)  HR: 94 (10 Oct 2019 18:12) (76 - 99)  BP: 108/66 (10 Oct 2019 18:12) (104/57 - 117/73)  BP(mean): --  RR: 16 (10 Oct 2019 18:12) (16 - 18)  SpO2: 97% (10 Oct 2019 18:12) (96% - 98%)    I&O's Summary        PHYSICAL EXAM:    GA: NAD, A+0 x 3  CV: RRR  Pulm: CTA BL  Abd: soft, nontender, nondistended, no rebound or guarding,   : lochia WNL  Extremities: no swelling or calf tenderness    EFM: 140, mod rosendo, + 10x10 accels, few variable decels  Lines:      LABS:                    RADIOLOGY & ADDITIONAL TESTS:

## 2019-10-10 NOTE — PROGRESS NOTE ADULT - ASSESSMENT
33 yo  26w3d a/w PPROM at 24w5d. Antepartum course complicated by cervical insufficiency with cerclage in place

## 2019-10-10 NOTE — PROGRESS NOTE ADULT - SUBJECTIVE AND OBJECTIVE BOX
MFM Fellow Note     Patient with no complaints. Continued leakage of fluid, denies contractions, vaginal bleeding and reports positive fetal movement.     Vital Signs Last 24 Hours  T(C): 36.7 (10-10-19 @ 05:41), Max: 36.8 (10-10-19 @ 01:53)  HR: 79 (10-10-19 @ 05:41) (76 - 113)  BP: 106/56 (10-10-19 @ 05:41) (104/57 - 121/71)  RR: 17 (10-10-19 @ 05:41) (16 - 18)  SpO2: 98% (10-10-19 @ 05:41) (97% - 99%)      NST overnight reactive

## 2019-10-11 PROCEDURE — 99232 SBSQ HOSP IP/OBS MODERATE 35: CPT

## 2019-10-11 RX ADMIN — Medication 1 TABLET(S): at 12:13

## 2019-10-11 RX ADMIN — HEPARIN SODIUM 5000 UNIT(S): 5000 INJECTION INTRAVENOUS; SUBCUTANEOUS at 06:06

## 2019-10-11 RX ADMIN — HEPARIN SODIUM 5000 UNIT(S): 5000 INJECTION INTRAVENOUS; SUBCUTANEOUS at 19:13

## 2019-10-11 NOTE — PROGRESS NOTE ADULT - ASSESSMENT
35 yo  26w4d a/w PPROM at 24w5d. Antepartum course complicated by cervical insufficiency with cerclage in place

## 2019-10-11 NOTE — PROGRESS NOTE ADULT - SUBJECTIVE AND OBJECTIVE BOX
MFM Fellow Progress Note     Patient examined at bedside. No complaint or acute events overnight. Denies contractions, vaginal bleeding, and reports positive fetal movement.     Vital Signs Last 24 Hours  T(C): 36.4 (10-11-19 @ 10:11), Max: 36.8 (10-10-19 @ 22:12)  HR: 100 (10-11-19 @ 12:21) (69 - 108)  BP: 106/63 (10-11-19 @ 10:12) (102/62 - 117/73)  RR: 16 (10-11-19 @ 10:11) (16 - 16)  SpO2: 100% (10-11-19 @ 12:21) (96% - 100%)    NST reviewed overnight, reactive, no contractions   TOCO: no contractions      baseline, mod rosendo, +accels, no decels  NO contractions       Assessment:  34 yr  @ 26w5d with premature rupture of membranes, afebrile, no evidence of chorioamnionitis. Cerclage in situ.  s/p Betamethasone -10/1  s/p latency antibiotics   Fetus with known pericardial effusion  Malpresentation - complete breech    Plan    Continue to monitor for  labor, chorioamnionitis and placental abruption   Continue to close fetal surveillance.  NST BID x 2 hours, and BPP twice weekly (Mon/Thur)  If evidence of non-reassuring fetal status will need to proceed with delivery. Cerclage to be removed if concern for cervical dilation.  Magnesium sulfate to be administered prior to delivery for neuroprotection   DVT: SCDs and Heparin subq, and ambulation       Omar Harrell MD   Maternal Fetal Medicine Fellow MFM Fellow Progress Note     Patient examined at bedside. No complaint or acute events overnight. Denies contractions, vaginal bleeding, and reports positive fetal movement.     Vital Signs Last 24 Hours  T(C): 36.4 (10-11-19 @ 10:11), Max: 36.8 (10-10-19 @ 22:12)  HR: 100 (10-11-19 @ 12:21) (69 - 108)  BP: 106/63 (10-11-19 @ 10:12) (102/62 - 117/73)  RR: 16 (10-11-19 @ 10:11) (16 - 16)  SpO2: 100% (10-11-19 @ 12:21) (96% - 100%)    NST reviewed overnight, reactive, no contractions   TOCO: no contractions      baseline, mod rosendo, +accels, no decels  NO contractions       Assessment:  34 yr  @ 26w4d with premature rupture of membranes, afebrile, no evidence of chorioamnionitis. Cerclage in situ.  s/p Betamethasone -10/1  s/p latency antibiotics   Fetus with known pericardial effusion  Malpresentation - complete breech    Plan    Continue to monitor for  labor, chorioamnionitis and placental abruption   Continue to close fetal surveillance.  NST BID x 2 hours, and BPP twice weekly (Mon/Thur)  If evidence of non-reassuring fetal status will need to proceed with delivery. Cerclage to be removed if concern for cervical dilation.  Magnesium sulfate to be administered prior to delivery for neuroprotection   DVT: SCDs and Heparin subq, and ambulation       Omar Harrell MD   Maternal Fetal Medicine Fellow

## 2019-10-11 NOTE — PROGRESS NOTE ADULT - SUBJECTIVE AND OBJECTIVE BOX
R3 Note  HD# 12    INTERVAL HPI/OVERNIGHT EVENTS: Pt seen and examined at bedside.  Pt without complaints.  Ambulating, passing flatus, tolerating regular diet, pain controlled with analgesia, urinating spontaneously  She denies nausea/vomiting/fever/chills/chest pain/SOB/dizziness.    MEDICATIONS  (STANDING):  heparin  Injectable 5000 Unit(s) SubCutaneous every 12 hours  influenza   Vaccine 0.5 milliLiter(s) IntraMuscular once  prenatal multivitamin 1 Tablet(s) Oral daily    MEDICATIONS  (PRN):      12 point ROS negative except as outlined above    Vital Signs Last 24 Hrs  T(C): 36.7 (11 Oct 2019 17:33), Max: 36.9 (11 Oct 2019 13:52)  T(F): 98 (11 Oct 2019 17:33), Max: 98.5 (11 Oct 2019 13:52)  HR: 95 (11 Oct 2019 17:33) (69 - 108)  BP: 114/67 (11 Oct 2019 17:33) (102/62 - 114/67)  BP(mean): --  RR: 17 (11 Oct 2019 17:33) (16 - 18)  SpO2: 96% (11 Oct 2019 17:33) (94% - 100%)    I&O's Summary        PHYSICAL EXAM:    GA: NAD, A+0 x 3  CV: RRR  Pulm: CTA BL  Abd: soft, nontender, nondistended, no rebound or guarding,   : lochia WNL  Extremities: no swelling or calf tenderness  Lines:      LABS:                    RADIOLOGY & ADDITIONAL TESTS:

## 2019-10-12 DIAGNOSIS — Z00.00 ENCOUNTER FOR GENERAL ADULT MEDICAL EXAMINATION WITHOUT ABNORMAL FINDINGS: ICD-10-CM

## 2019-10-12 PROCEDURE — 99232 SBSQ HOSP IP/OBS MODERATE 35: CPT

## 2019-10-12 RX ADMIN — HEPARIN SODIUM 5000 UNIT(S): 5000 INJECTION INTRAVENOUS; SUBCUTANEOUS at 06:10

## 2019-10-12 RX ADMIN — HEPARIN SODIUM 5000 UNIT(S): 5000 INJECTION INTRAVENOUS; SUBCUTANEOUS at 18:19

## 2019-10-12 RX ADMIN — Medication 1 TABLET(S): at 12:15

## 2019-10-12 NOTE — PROGRESS NOTE ADULT - SUBJECTIVE AND OBJECTIVE BOX
MFM Fellow    Patient seen at bedside. Currently sitting up with no complaints. +FM, denies ctx/vb. Still with some LOF. OOB as tolerated. Denies any abdominal pain, fevers/chills, cp/sob.     12 point ROS negative except as outlined above    Vital Signs Last 24 Hrs  T(C): 36.7 (12 Oct 2019 10:09), Max: 37.2 (12 Oct 2019 01:57)  T(F): 98 (12 Oct 2019 10:09), Max: 98.9 (12 Oct 2019 01:57)  HR: 92 (12 Oct 2019 11:30) (73 - 97)  BP: 118/66 (12 Oct 2019 10:09) (97/53 - 118/66)  RR: 18 (12 Oct 2019 10:09) (17 - 18)  SpO2: 98% (12 Oct 2019 11:30) (94% - 99%)    PHYSICAL EXAM:    GA: NAD, A+0 x 3  Abd: soft, nontender, nondistended  Extremities: no swelling or calf tenderness, reflexes +2 bilaterally    , mod rosendo, +accels, variables noted  TOCO none

## 2019-10-12 NOTE — PROGRESS NOTE ADULT - PROBLEM SELECTOR PLAN 2
Regular diet  Will initiate GCT on monday  HSQ/PNV/Folic Acid    Seen with Dr. Aurelio Gusman M Fellow

## 2019-10-12 NOTE — PROGRESS NOTE ADULT - SUBJECTIVE AND OBJECTIVE BOX
R3 Antepartum Progress Note - HD#13    Subjective  Patient seen and examined at bedside, no acute overnight events. This AM, no acute complaints. Today, she reports pink tinge to amniotic fluid that is leaking but denies CTX, vaginal bleeding, fever, chills, sweats, abdominal pain.     Objective  Vital Signs Last 24 Hrs  T(C): 36.6 (12 Oct 2019 05:11), Max: 37.2 (12 Oct 2019 01:57)  T(F): 97.8 (12 Oct 2019 05:11), Max: 98.9 (12 Oct 2019 01:57)  HR: 78 (12 Oct 2019 05:11) (73 - 108)  BP: 97/53 (12 Oct 2019 05:11) (97/53 - 114/67)  BP(mean): --  RR: 17 (12 Oct 2019 05:11) (16 - 18)  SpO2: 97% (12 Oct 2019 05:11) (94% - 100%)    Physical Exam:  General: NAD  CV: RRR, no murmurs, S1, S2  Resp: CTA-B, symmetrical expansion   Abdomen: Soft, non-tender, non-distended, gravid   : clear fluid on pad  Ext: no edema/tenderness in LE B/l    FHR monitoring: baseline 140, mod variability, +10x10 accels, intermittent variable decels    Labs:    Blood Type: A Positive  Antibody Screen: --  RPR: Negative               10.1   10.53 )-----------( 270      ( 09-30 @ 13:00 )             32.6       MEDICATIONS  (STANDING):  heparin  Injectable 5000 Unit(s) SubCutaneous every 12 hours  influenza   Vaccine 0.5 milliLiter(s) IntraMuscular once  prenatal multivitamin 1 Tablet(s) Oral daily    MEDICATIONS  (PRN):

## 2019-10-13 PROCEDURE — 99232 SBSQ HOSP IP/OBS MODERATE 35: CPT

## 2019-10-13 RX ADMIN — Medication 1 TABLET(S): at 13:45

## 2019-10-13 RX ADMIN — HEPARIN SODIUM 5000 UNIT(S): 5000 INJECTION INTRAVENOUS; SUBCUTANEOUS at 19:15

## 2019-10-13 RX ADMIN — HEPARIN SODIUM 5000 UNIT(S): 5000 INJECTION INTRAVENOUS; SUBCUTANEOUS at 05:30

## 2019-10-13 NOTE — PROGRESS NOTE ADULT - SUBJECTIVE AND OBJECTIVE BOX
R3 Antepartum Note - HD#    S: Patient seen and examined at bedside, no acute overnight events. No acute complaints.     Pt reports good fetal movement.  Endorses leakage of fluid, contractions, vaginal bleeding.       Denies HA, epigastric pain, blurred vision, CP, SOB, fevers, and chills.  Endorses brief episode of nausea early last night that immediately passed.  Has not felt nauseous since then, denies any episodes of vomiting.  Is passing gas, ambulating, voiding without issue, denies dysuria, tolerating PO.    O:   Vital Signs Last 24 Hours  T(C): 36.7 (10-13-19 @ 05:29), Max: 36.7 (10-12-19 @ 10:09)  HR: 80 (10-13-19 @ 05:29) (77 - 100)  BP: 95/51 (10-13-19 @ 05:29) (95/51 - 119/71)  RR: 16 (10-13-19 @ 05:29) (16 - 18)  SpO2: 96% (10-13-19 @ 05:29) (95% - 98%)        Physical Exam:  Cardio: nl S1/S2, RRR  Pulm: CTABL  General: NAD  Abdomen: Soft, non-tender, gravid  Ext: No pain or swelling    Labs:                MEDICATIONS  (STANDING):  heparin  Injectable 5000 Unit(s) SubCutaneous every 12 hours  influenza   Vaccine 0.5 milliLiter(s) IntraMuscular once  prenatal multivitamin 1 Tablet(s) Oral daily    MEDICATIONS  (PRN):

## 2019-10-13 NOTE — PROGRESS NOTE ADULT - ASSESSMENT
A/P: 35 yo  26w6d a/w PPROM 24w5d. Clinical condition unchanged.  HD#14    #Maternal well being   - CV: hemodynamically stable  - Resp: saturating well on RA   - GI: reg diet  - : pad counts  - Heme: HSQ for DVT ppx   - ID: afebrile, no signs of infection     #Fetal well being  -  s/p BMZ for fetal lung maturity (-10/1)  - s/p latency antibiotics   - s/p Mg for neuroprotection  - prenatal vitamin  - NST BID  - BPP  and     REAL Raman PGY3

## 2019-10-14 ENCOUNTER — ASOB RESULT (OUTPATIENT)
Age: 34
End: 2019-10-14

## 2019-10-14 ENCOUNTER — APPOINTMENT (OUTPATIENT)
Dept: ANTEPARTUM | Facility: HOSPITAL | Age: 34
End: 2019-10-14
Payer: COMMERCIAL

## 2019-10-14 ENCOUNTER — OUTPATIENT (OUTPATIENT)
Dept: OUTPATIENT SERVICES | Facility: HOSPITAL | Age: 34
LOS: 1 days | End: 2019-10-14

## 2019-10-14 DIAGNOSIS — Z98.890 OTHER SPECIFIED POSTPROCEDURAL STATES: Chronic | ICD-10-CM

## 2019-10-14 DIAGNOSIS — Z90.49 ACQUIRED ABSENCE OF OTHER SPECIFIED PARTS OF DIGESTIVE TRACT: Chronic | ICD-10-CM

## 2019-10-14 LAB
BLD GP AB SCN SERPL QL: NEGATIVE — SIGNIFICANT CHANGE UP
GLUCOSE PRE/P GLC SERPL-SCNC: 151 — HIGH (ref 65–115)
RH IG SCN BLD-IMP: POSITIVE — SIGNIFICANT CHANGE UP

## 2019-10-14 PROCEDURE — 76819 FETAL BIOPHYS PROFIL W/O NST: CPT | Mod: 26

## 2019-10-14 PROCEDURE — 76820 UMBILICAL ARTERY ECHO: CPT | Mod: 26

## 2019-10-14 RX ADMIN — HEPARIN SODIUM 5000 UNIT(S): 5000 INJECTION INTRAVENOUS; SUBCUTANEOUS at 06:08

## 2019-10-14 RX ADMIN — HEPARIN SODIUM 5000 UNIT(S): 5000 INJECTION INTRAVENOUS; SUBCUTANEOUS at 18:04

## 2019-10-14 RX ADMIN — Medication 1 TABLET(S): at 14:35

## 2019-10-14 NOTE — PROGRESS NOTE ADULT - SUBJECTIVE AND OBJECTIVE BOX
R3 Antepartum Note-HD#15    Patient seen and examined at bedside, no acute overnight events. No acute complaints. Pt notes continued, scant clear discharge.     Pt reports +FM, denies LOF, VB, ctx, CP, SOB, N/V, fevers, and chills. Denies HA, blurry vision, RUQ/epigastric pain, new onset swelling.     Vital Signs Last 24 Hours  T(C): 36.7 (10-14-19 @ 05:31), Max: 36.7 (10-13-19 @ 09:52)  HR: 78 (10-14-19 @ 05:31) (78 - 106)  BP: 92/54 (10-14-19 @ 05:31) (92/54 - 117/55)  RR: 17 (10-14-19 @ 05:31) (17 - 18)  SpO2: 98% (10-14-19 @ 05:31) (97% - 100%)    CAPILLARY BLOOD GLUCOSE          Physical Exam:  General: NAD  CV: RRR  Resp: CTAB  Abdomen: Soft, non-tender, gravid  Ext: No pain or swelling    Labs:                MEDICATIONS  (STANDING):  heparin  Injectable 5000 Unit(s) SubCutaneous every 12 hours  influenza   Vaccine 0.5 milliLiter(s) IntraMuscular once  prenatal multivitamin 1 Tablet(s) Oral daily    MEDICATIONS  (PRN):

## 2019-10-14 NOTE — PROGRESS NOTE ADULT - ASSESSMENT
A/P: 33 yo  27w0d a/w PPROM 24w5d. Clinical condition unchanged.  HD#14    #Maternal well being   - CV: hemodynamically stable  - Resp: saturating well on RA   - GI: reg diet; plan for GCT today  - : pad counts  - Heme: HSQ for DVT ppx   - ID: afebrile, no signs of infection. Plan for influenza vaccine today. Pt counselled on vaccine and agreeable to receiving it today.     #Fetal well being  -  s/p BMZ for fetal lung maturity (-10/1)  - s/p latency antibiotics   - s/p Mg for neuroprotection  - prenatal vitamin  - NST BID  - BPP  and     Kbeetham PGY3 A/P: 35 yo  27w0d a/w PPROM 24w5d. Clinical condition unchanged.  HD#15    #Maternal well being   - CV: hemodynamically stable  - Resp: saturating well on RA   - GI: reg diet; plan for GCT today  - : pad counts  - Heme: HSQ for DVT ppx   - ID: afebrile, no signs of infection. Plan for influenza vaccine today. Pt counselled on vaccine and agreeable to receiving it today.     #Fetal well being  -  s/p BMZ for fetal lung maturity (-10/1)  - s/p latency antibiotics   - s/p Mg for neuroprotection  - prenatal vitamin  - NST BID  - BPP  and     Kbeetham PGY3

## 2019-10-15 DIAGNOSIS — O24.419 GESTATIONAL DIABETES MELLITUS IN PREGNANCY, UNSPECIFIED CONTROL: ICD-10-CM

## 2019-10-15 LAB
GLUCOSE 1H P CHAL SERPL-SCNC: 176 MG/DL — HIGH (ref 120–170)
GLUCOSE 2H P GLC SERPL-MCNC: 201 MG/DL — HIGH (ref 70–120)
GLUCOSE 3H P CHAL SERPL-SCNC: 133 MG/DL — HIGH (ref 70–115)
GLUCOSE P FAST SERPL-MCNC: 97 MG/DL — SIGNIFICANT CHANGE UP (ref 70–108)

## 2019-10-15 PROCEDURE — 99252 IP/OBS CONSLTJ NEW/EST SF 35: CPT | Mod: GC

## 2019-10-15 RX ADMIN — HEPARIN SODIUM 5000 UNIT(S): 5000 INJECTION INTRAVENOUS; SUBCUTANEOUS at 06:09

## 2019-10-15 RX ADMIN — HEPARIN SODIUM 5000 UNIT(S): 5000 INJECTION INTRAVENOUS; SUBCUTANEOUS at 18:19

## 2019-10-15 RX ADMIN — Medication 1 TABLET(S): at 13:09

## 2019-10-15 NOTE — PROGRESS NOTE ADULT - SUBJECTIVE AND OBJECTIVE BOX
R3 Antepartum Note-HD#16    Patient seen and examined at bedside, no acute overnight events. No acute complaints. Pt reports +FM, still with minimal loss of clear fluid. Denies VB, ctx, CP, SOB, N/V, fevers, and chills. Denies HA, blurry vision, RUQ/epigastric pain, new onset swelling.     Vital Signs Last 24 Hours  T(C): 37.2 (10-15-19 @ 18:45), Max: 37.2 (10-15-19 @ 18:45)  HR: 89 (10-15-19 @ 18:45) (79 - 102)  BP: 110/66 (10-15-19 @ 18:45) (104/65 - 112/58)  RR: 17 (10-15-19 @ 18:45) (16 - 17)  SpO2: 98% (10-15-19 @ 18:45) (96% - 99%)    CAPILLARY BLOOD GLUCOSE      POCT Blood Glucose.: 102 mg/dL (15 Oct 2019 19:49)  POCT Blood Glucose.: 124 mg/dL (15 Oct 2019 13:58)    GA: NAD, A+0 x 3  Abd: soft, nontender, nondistended  Extremities: no swelling or calf tenderness      Labs:      MEDICATIONS  (STANDING):  heparin  Injectable 5000 Unit(s) SubCutaneous every 12 hours  influenza   Vaccine 0.5 milliLiter(s) IntraMuscular once  prenatal multivitamin 1 Tablet(s) Oral daily    MEDICATIONS  (PRN):

## 2019-10-15 NOTE — PROGRESS NOTE ADULT - PROBLEM SELECTOR PLAN 2
- and GTT today with 2 abnormal values (Fasting 97, 2h )  -Consistent carb diet   -Fasting and postprandial FS  -Will reevaluate need for management with medication if inadequate control obtained with diet modifications.   d/w Dr. Carey and Dr. Broderick

## 2019-10-15 NOTE — CHART NOTE - NSCHARTNOTEFT_GEN_A_CORE
Att MFM  Pat at 27wks with PROM, underwent a GCT 151mg  The following 3hr GTT was abnormal (2 abn values)  Impr GDM  Plan Diabetic diet         Glucose monitoring

## 2019-10-16 PROCEDURE — 99232 SBSQ HOSP IP/OBS MODERATE 35: CPT

## 2019-10-16 RX ADMIN — HEPARIN SODIUM 5000 UNIT(S): 5000 INJECTION INTRAVENOUS; SUBCUTANEOUS at 06:17

## 2019-10-16 RX ADMIN — Medication 1 TABLET(S): at 11:39

## 2019-10-16 RX ADMIN — HEPARIN SODIUM 5000 UNIT(S): 5000 INJECTION INTRAVENOUS; SUBCUTANEOUS at 18:02

## 2019-10-16 NOTE — PROGRESS NOTE ADULT - PROBLEM SELECTOR PLAN 2
-Overnight FS 93, 102, 124, will continue to trend to evaluate for adequate control with diet modifications  - and GTT with 2 abnormal values (Fasting 97, 2h )  -Consistent carb diet   -Fasting and postprandial FS  -Diabetes education   -Will reevaluate need for management with medication if inadequate control obtained with diet modifications.

## 2019-10-16 NOTE — PROGRESS NOTE ADULT - SUBJECTIVE AND OBJECTIVE BOX
R3 Antepartum Note-HD#16    Patient seen and examined at bedside, no acute overnight events. No acute complaints. Pt reports +FM, complaint of scant pink discharge overnight on toliet paper with continued loss of clear fluid <1pad overnight.  Denies VB, ctx, CP, SOB, N/V, fevers, and chills. Denies HA, blurry vision, RUQ/epigastric pain, new onset swelling.       Vital Signs Last 24 Hours  T(C): 36.7 (10-16-19 @ 06:09), Max: 37.2 (10-15-19 @ 18:45)  HR: 87 (10-16-19 @ 06:09) (73 - 102)  BP: 96/55 (10-16-19 @ 06:09) (96/55 - 110/66)  RR: 18 (10-16-19 @ 06:09) (16 - 19)  SpO2: 98% (10-16-19 @ 06:09) (96% - 100%)    CAPILLARY BLOOD GLUCOSE      POCT Blood Glucose.: 93 mg/dL (15 Oct 2019 22:48)  POCT Blood Glucose.: 102 mg/dL (15 Oct 2019 19:49)  POCT Blood Glucose.: 124 mg/dL (15 Oct 2019 13:58)      Physical Exam:  General: NAD  Abdomen: Soft, non-tender, gravid  Ext: No pain or swelling    EFM: baseline 135, mod rosendo, +accels, +intermittent variables   [place]: no ctxns      Labs:                MEDICATIONS  (STANDING):  heparin  Injectable 5000 Unit(s) SubCutaneous every 12 hours  influenza   Vaccine 0.5 milliLiter(s) IntraMuscular once  prenatal multivitamin 1 Tablet(s) Oral daily    MEDICATIONS  (PRN):

## 2019-10-16 NOTE — CHART NOTE - NSCHARTNOTEFT_GEN_A_CORE
Source: Patient [x] Medical record reviewed. Patient seen for length of stay nutrition follow-up. Patient reports good appetite/PO intake at this time. S/p GTT with 2 abnormal values - newly diagnosed GDM. Provided diet education regarding Consistent Carbohydrate diet modifications, carb counting. Patient reports understanding as her daughter has T1DM. No nausea/vomiting reported. Patient notes she is having some constipation - had BM yesterday but has been having 3-4 days between BMs.    Current Diet : Consistent Carbohydrate Gestational with 3 snacks  Reported:  [ ] nausea  [ ] vomiting [ ] diarrhea [x] constipation  [ ]chewing problems [ ] swallowing issues  [ ] other:   PO intake:  < 50% [ ] 50-75% [ ]   % [x]  other :  Current Weight: no new weight recorded, 81.6kg (9/30)    __________________ Pertinent Medications__________________   MEDICATIONS  (STANDING):  heparin  Injectable 5000 Unit(s) SubCutaneous every 12 hours  influenza   Vaccine 0.5 milliLiter(s) IntraMuscular once  prenatal multivitamin 1 Tablet(s) Oral daily    _________________ Pertinent Labs__________________   CAPILLARY BLOOD GLUCOSE  POCT Blood Glucose.: 142 mg/dL (16 Oct 2019 10:26)  POCT Blood Glucose.: 86 mg/dL (16 Oct 2019 08:06)  POCT Blood Glucose.: 93 mg/dL (15 Oct 2019 22:48)  POCT Blood Glucose.: 102 mg/dL (15 Oct 2019 19:49)  POCT Blood Glucose.: 124 mg/dL (15 Oct 2019 13:58)      Skin: No pressure ulcers/DTI noted in flowsheets.   Edema: none noted    Estimated Needs:   [x] no change since previous assessment  [ ] recalculated:     Previous Nutrition Diagnosis:   [x] Increased Nutrient Needs  Nutrition Diagnosis is [x] ongoing  [ ] resolved [ ] not applicable     New Nutrition Diagnosis: [ ] not applicable  Altered nutrition-related labs related to physiological causes As evidenced by patient s/p GTT, newly diagnosed GDM.    Goal(s):  1. Patient to meet > 75% estimated pro/kcal needs.  2. Maintain blood glucose control.    Interventions:   1. Continue Consistent Carbohydrate Gestational with 3 Snacks diet.   2. Continue prenatal multivitamin.   3. Consider starting stool softeners to help alleviate constipation.   4. Diet education provided.     Monitoring and Evaluation:   1. Monitor weights, labs, BM's, skin integrity, p.o. intake.  Follow-up per department protocol.   RD to remain available, Jany Albarran RDN, CDN #[id number]

## 2019-10-16 NOTE — PROGRESS NOTE ADULT - SUBJECTIVE AND OBJECTIVE BOX
S: Patient examined at bedside. No acute events overnight. Reports continued leakage of fluid but otherwise no complaints. Denies contractions, vaginal bleeding and reports positive fetal movement     Vital Signs Last 24 Hours  T(C): 36.7 (10-19 @ 10:19), Max: 37.2 (10-15-19 @ 18:45)  HR: 97 (10-16-19 @ 10:19) (73 - 97)  BP: 107/60 (10-16- @ 10:19) (96/55 - 110/66)  RR: 16 (10-16-19 @ 10:19) (16 - 19)  SpO2: 97% (10-16-19 @ 10:19) (97% - 100%)    Fingersticks:   Fasting 86  0800 142  1030 142  1330 126      Assessment:  34 yr  @ 27w2d with premature rupture of membranes, afebrile, no evidence of chorioamnionitis. Cerclage in situ.  s/p Betamethasone -10/1  s/p latency antibiotics   Fetus with known pericardial effusion  Malpresentation - complete breech  GDMA1 newly diagnosed    Plan    Continue to monitor for  labor, chorioamnionitis and placental abruption   Continue to close fetal surveillance.  NST BID x 2 hours, and BPP twice weekly (Mon/Thur)  If evidence of non-reassuring fetal status will need to proceed with delivery. Cerclage to be removed if concern for cervical dilation.  Magnesium sulfate to be administered prior to delivery for neuroprotection   Continue to monitor fingersticks closely, if elevated fingersticks will start insulin   Diabetic Education to be completed today  DVT: SCDs and Heparin subq, and ambulation       Omar Harrell MD   Maternal Fetal Medicine Fellow

## 2019-10-17 ENCOUNTER — APPOINTMENT (OUTPATIENT)
Dept: ANTEPARTUM | Facility: HOSPITAL | Age: 34
End: 2019-10-17
Payer: COMMERCIAL

## 2019-10-17 ENCOUNTER — OUTPATIENT (OUTPATIENT)
Dept: OUTPATIENT SERVICES | Facility: HOSPITAL | Age: 34
LOS: 1 days | End: 2019-10-17

## 2019-10-17 DIAGNOSIS — O41.92X0 DISORDER OF AMNIOTIC FLUID AND MEMBRANES, UNSPECIFIED, SECOND TRIMESTER, NOT APPLICABLE OR UNSPECIFIED: ICD-10-CM

## 2019-10-17 DIAGNOSIS — Z98.890 OTHER SPECIFIED POSTPROCEDURAL STATES: Chronic | ICD-10-CM

## 2019-10-17 DIAGNOSIS — O36.5920 MATERNAL CARE FOR OTHER KNOWN OR SUSPECTED POOR FETAL GROWTH, SECOND TRIMESTER, NOT APPLICABLE OR UNSPECIFIED: ICD-10-CM

## 2019-10-17 DIAGNOSIS — Z90.49 ACQUIRED ABSENCE OF OTHER SPECIFIED PARTS OF DIGESTIVE TRACT: Chronic | ICD-10-CM

## 2019-10-17 DIAGNOSIS — O42.912 PRETERM PREMATURE RUPTURE OF MEMBRANES, UNSPECIFIED AS TO LENGTH OF TIME BETWEEN RUPTURE AND ONSET OF LABOR, SECOND TRIMESTER: ICD-10-CM

## 2019-10-17 PROCEDURE — 76816 OB US FOLLOW-UP PER FETUS: CPT | Mod: 26

## 2019-10-17 PROCEDURE — 76820 UMBILICAL ARTERY ECHO: CPT | Mod: 26

## 2019-10-17 RX ADMIN — HEPARIN SODIUM 5000 UNIT(S): 5000 INJECTION INTRAVENOUS; SUBCUTANEOUS at 06:41

## 2019-10-17 RX ADMIN — HEPARIN SODIUM 5000 UNIT(S): 5000 INJECTION INTRAVENOUS; SUBCUTANEOUS at 17:33

## 2019-10-17 RX ADMIN — Medication 1 TABLET(S): at 14:34

## 2019-10-17 NOTE — PROGRESS NOTE ADULT - ASSESSMENT
Assessment:  34 yr  @ 27w3d with premature rupture of membranes, afebrile, no evidence of chorioamnionitis. Cerclage in situ.  s/p Betamethasone -10/1  s/p latency antibiotics   Fetus with known pericardial effusion  GDMA1 newly diagnosed, fingersticks within normal limits     Plan    Continue to monitor for  labor, chorioamnionitis and placental abruption   Continue to close fetal surveillance.  NST BID x 2 hours, and BPP twice weekly (Mon/Thur)  If evidence of non-reassuring fetal status will need to proceed with delivery. Cerclage to be removed if concern for cervical dilation.  Magnesium sulfate to be administered prior to delivery for neuroprotection   Continue to monitor fingersticks closely, if elevated fingersticks will start insulin, encouraged ambulation.   Diabetic Education to be completed today  DVT: SCDs and Heparin subq, and ambulation

## 2019-10-17 NOTE — PROGRESS NOTE ADULT - SUBJECTIVE AND OBJECTIVE BOX
R3 Antepartum Note-HD#17    Patient seen and examined at bedside, no acute overnight events. No acute complaints. Pt reports +FM, denies LOF, VB, ctx, CP, SOB, N/V, fevers, and chills. Denies HA, blurry vision, RUQ/epigastric pain, new onset swelling.     Vital Signs Last 24 Hours  T(C): 36.7 (10-17-19 @ 10:16), Max: 36.7 (10-16-19 @ 14:47)  HR: 98 (10-17-19 @ 10:16) (77 - 99)  BP: 117/62 (10-17-19 @ 10:16) (91/54 - 117/67)  RR: 17 (10-17-19 @ 10:16) (17 - 166)  SpO2: 99% (10-17-19 @ 10:16) (96% - 100%)    CAPILLARY BLOOD GLUCOSE      POCT Blood Glucose.: 127 mg/dL (17 Oct 2019 09:53)  POCT Blood Glucose.: 87 mg/dL (17 Oct 2019 08:17)  POCT Blood Glucose.: 109 mg/dL (16 Oct 2019 22:45)  POCT Blood Glucose.: 131 mg/dL (16 Oct 2019 18:33)  POCT Blood Glucose.: 126 mg/dL (16 Oct 2019 13:26)      Physical Exam:  General: NAD  Abdomen: Soft, non-tender, gravid  Ext: No pain or swelling    Labs:                MEDICATIONS  (STANDING):  heparin  Injectable 5000 Unit(s) SubCutaneous every 12 hours  influenza   Vaccine 0.5 milliLiter(s) IntraMuscular once  prenatal multivitamin 1 Tablet(s) Oral daily    MEDICATIONS  (PRN):

## 2019-10-17 NOTE — PROGRESS NOTE ADULT - PROBLEM SELECTOR PLAN 2
-FS : 86, 142, 126, 131, 109, 87, 127  -Will continue to trend to evaluate for adequate control with diet modifications  - and GTT with 2 abnormal values (Fasting 97, 2h )  -s/p Diabetes education today  -Consistent carb diet   -Fasting and postprandial FS   -Will reevaluate need for management with medication if inadequate control obtained with diet modifications.

## 2019-10-17 NOTE — ADVANCED PRACTICE NURSE CONSULT - ASSESSMENT
Patient has a daughter with type 1 diabetes that is 17 years old. Patient admits to having s/s of hypoglycemia. Patient instructed on s/s and proper treatment of hypoglycemia. Patient instructed that hypoglycemia during pregnancy is a blood sugar less than 60mg/dl. Patient also instructed on GDM and blood sugar targets during pregnancy  Patient has seen the dietitian and was able to teach back information taught. Patient was instructed on complication of pregnancy/delivery. Patient also instructed on risks of developing type 2 diabetes and the importance of maintaining lifestyle changes with diet and exercise for prevention. Patient was also instructed on the benefits of breast feeding. Patient demonstrated understanding of all information taught.  through teachback.

## 2019-10-17 NOTE — ADVANCED PRACTICE NURSE CONSULT - REASON FOR CONSULT
patient is a 35 y/o EDC 2020 EGA 25 reports of leaking water 2019 at 1600. Denies cramping, contractions. Reports of fetal movement. Patient was seen in the office today. Office report indicates Nitrazine positive, pooling of amniotic fluid.   2019 sono: fetus is breech presentation, vasa previa,  gram and cervical length 1.3    AP complication:  - Head multiple anomalies, low set ears, hypotelorism, cerebellum small for gestation  - cerclage placed 9/10  - incompetent cervix 1.75 cm, after cerclage 3.16cm  Medical History: Denies  Surgical History: LEEP, Appendectomy 16 y/o ago, 2019 D&C x 1 for heavy bleeding  OBGYN History:   -2002  Female 6.9  -SAB x1  -history of chlamydia    Patient referred for diabetes education because newly diagnosed with gestational diabetes

## 2019-10-17 NOTE — PROGRESS NOTE ADULT - SUBJECTIVE AND OBJECTIVE BOX
MFM Fellow Progress Note     Patient examined at bedside. No acute events overnight.     Vital Signs Last 24 Hours  T(C): 36.7 (10-17-19 @ 10:16), Max: 36.7 (10-16-19 @ 14:47)  HR: 98 (10-17-19 @ 10:16) (77 - 99)  BP: 117/62 (10-17-19 @ 10:16) (91/54 - 117/67)  RR: 17 (10-17-19 @ 10:16) (17 - 166)  SpO2: 99% (10-17-19 @ 10:16) (96% - 100%)    FS: 86, 142/, 126, 131, 109, 87, 127

## 2019-10-18 RX ADMIN — HEPARIN SODIUM 5000 UNIT(S): 5000 INJECTION INTRAVENOUS; SUBCUTANEOUS at 17:59

## 2019-10-18 RX ADMIN — Medication 1 TABLET(S): at 17:59

## 2019-10-18 RX ADMIN — HEPARIN SODIUM 5000 UNIT(S): 5000 INJECTION INTRAVENOUS; SUBCUTANEOUS at 06:08

## 2019-10-18 NOTE — PROGRESS NOTE ADULT - SUBJECTIVE AND OBJECTIVE BOX
R3 Note  HD#19    INTERVAL HPI/OVERNIGHT EVENTS: Pt seen and examined at bedside.  Pt without complaints.  Ambulating, passing flatus, tolerating regular diet, pain controlled with analgesia, urinating spontaneously  She denies nausea/vomiting/fever/chills/chest pain/SOB/dizziness.    MEDICATIONS  (STANDING):  heparin  Injectable 5000 Unit(s) SubCutaneous every 12 hours  influenza   Vaccine 0.5 milliLiter(s) IntraMuscular once  prenatal multivitamin 1 Tablet(s) Oral daily    MEDICATIONS  (PRN):      12 point ROS negative except as outlined above    Vital Signs Last 24 Hrs  T(C): 36.8 (18 Oct 2019 01:42), Max: 36.8 (17 Oct 2019 14:38)  T(F): 98.3 (18 Oct 2019 01:42), Max: 98.3 (18 Oct 2019 01:42)  HR: 77 (18 Oct 2019 01:42) (77 - 102)  BP: 97/55 (18 Oct 2019 01:42) (97/55 - 117/62)  BP(mean): --  RR: 17 (18 Oct 2019 01:42) (16 - 17)  SpO2: 98% (18 Oct 2019 01:42) (97% - 100%)    I&O's Summary        PHYSICAL EXAM:    GA: NAD, A+0 x 3  CV: RRR  Pulm: CTA BL  Abd: soft, nontender, nondistended, no rebound or guarding,   : clear fluid  Extremities: no swelling or calf tenderness  Lines:      LABS:                    RADIOLOGY & ADDITIONAL TESTS:

## 2019-10-18 NOTE — PROGRESS NOTE ADULT - SUBJECTIVE AND OBJECTIVE BOX
MFM Fellow Progress Note     Patient examined bedside. No acute events overnight. Denies vaginal bleeding, contractions and reports positive fetal movement       Vital Signs Last 24 Hours  T(C): 36.5 (10-18-19 @ 10:14), Max: 36.8 (10-17-19 @ 14:38)  HR: 93 (10-18-19 @ 10:14) (77 - 102)  BP: 104/57 (10-18-19 @ 10:14) (97/55 - 117/59)  RR: 18 (10-18-19 @ 10:14) (16 - 18)  SpO2: 98% (10-18-19 @ 10:14) (95% - 100%)    FS: 87, 127, 137, 114, 83, 135

## 2019-10-18 NOTE — PROGRESS NOTE ADULT - ASSESSMENT
Assessment:  34 yr  @ 27w4d with premature rupture of membranes, afebrile, no evidence of chorioamnionitis. Cerclage in situ.  s/p Betamethasone -10/1  s/p latency antibiotics   Fetus with known pericardial effusion  GDMA1 newly diagnosed, fingersticks within normal limits, s/p diabetic education    Plan    Continue to monitor for  labor, chorioamnionitis and placental abruption   Continue to close fetal surveillance.  NST BID x 2 hours, and BPP twice weekly (Mon/Th)  If evidence of non-reassuring fetal status will need to proceed with delivery. Cerclage to be removed if concern for cervical dilation.  Magnesium sulfate to be administered prior to delivery for neuroprotection   Continue to monitor fingersticks closely, if elevated fingersticks will start insulin, encouraged ambulation.   DVT: SCDs and Heparin subq, and ambulation   Delivery at 34 weeks unless otherwise indicated.       Seen with Dr. Carey ([facility] Attending)     Omar Harrell   Maternal Fetal Medicine Fellow

## 2019-10-19 LAB
BASOPHILS # BLD AUTO: 0.03 K/UL — SIGNIFICANT CHANGE UP (ref 0–0.2)
BASOPHILS NFR BLD AUTO: 0.2 % — SIGNIFICANT CHANGE UP (ref 0–2)
BLD GP AB SCN SERPL QL: NEGATIVE — SIGNIFICANT CHANGE UP
EOSINOPHIL # BLD AUTO: 0.17 K/UL — SIGNIFICANT CHANGE UP (ref 0–0.5)
EOSINOPHIL NFR BLD AUTO: 1.3 % — SIGNIFICANT CHANGE UP (ref 0–6)
HCT VFR BLD CALC: 30.2 % — LOW (ref 34.5–45)
HGB BLD-MCNC: 9.6 G/DL — LOW (ref 11.5–15.5)
IMM GRANULOCYTES NFR BLD AUTO: 1.7 % — HIGH (ref 0–1.5)
LYMPHOCYTES # BLD AUTO: 17.3 % — SIGNIFICANT CHANGE UP (ref 13–44)
LYMPHOCYTES # BLD AUTO: 2.18 K/UL — SIGNIFICANT CHANGE UP (ref 1–3.3)
MAGNESIUM SERPL-MCNC: 4.7 MG/DL — HIGH (ref 1.6–2.6)
MAGNESIUM SERPL-MCNC: 5.1 MG/DL — HIGH (ref 1.6–2.6)
MCHC RBC-ENTMCNC: 25.1 PG — LOW (ref 27–34)
MCHC RBC-ENTMCNC: 31.8 % — LOW (ref 32–36)
MCV RBC AUTO: 78.9 FL — LOW (ref 80–100)
MONOCYTES # BLD AUTO: 0.82 K/UL — SIGNIFICANT CHANGE UP (ref 0–0.9)
MONOCYTES NFR BLD AUTO: 6.5 % — SIGNIFICANT CHANGE UP (ref 2–14)
NEUTROPHILS # BLD AUTO: 9.21 K/UL — HIGH (ref 1.8–7.4)
NEUTROPHILS NFR BLD AUTO: 73 % — SIGNIFICANT CHANGE UP (ref 43–77)
NRBC # FLD: 0 K/UL — SIGNIFICANT CHANGE UP (ref 0–0)
PLATELET # BLD AUTO: 233 K/UL — SIGNIFICANT CHANGE UP (ref 150–400)
PMV BLD: 11.3 FL — SIGNIFICANT CHANGE UP (ref 7–13)
RBC # BLD: 3.83 M/UL — SIGNIFICANT CHANGE UP (ref 3.8–5.2)
RBC # FLD: 15.8 % — HIGH (ref 10.3–14.5)
RH IG SCN BLD-IMP: POSITIVE — SIGNIFICANT CHANGE UP
WBC # BLD: 12.62 K/UL — HIGH (ref 3.8–10.5)
WBC # FLD AUTO: 12.62 K/UL — HIGH (ref 3.8–10.5)

## 2019-10-19 RX ORDER — MAGNESIUM SULFATE 500 MG/ML
4 VIAL (ML) INJECTION ONCE
Refills: 0 | Status: COMPLETED | OUTPATIENT
Start: 2019-10-19 | End: 2019-10-19

## 2019-10-19 RX ORDER — HEPARIN SODIUM 5000 [USP'U]/ML
5000 INJECTION INTRAVENOUS; SUBCUTANEOUS EVERY 12 HOURS
Refills: 0 | Status: DISCONTINUED | OUTPATIENT
Start: 2019-10-19 | End: 2019-10-22

## 2019-10-19 RX ORDER — SODIUM CHLORIDE 9 MG/ML
1000 INJECTION, SOLUTION INTRAVENOUS
Refills: 0 | Status: DISCONTINUED | OUTPATIENT
Start: 2019-10-19 | End: 2019-10-19

## 2019-10-19 RX ORDER — SODIUM CHLORIDE 9 MG/ML
1000 INJECTION, SOLUTION INTRAVENOUS ONCE
Refills: 0 | Status: COMPLETED | OUTPATIENT
Start: 2019-10-19 | End: 2019-10-19

## 2019-10-19 RX ORDER — CALCIUM CARBONATE 500(1250)
1 TABLET ORAL EVERY 6 HOURS
Refills: 0 | Status: DISCONTINUED | OUTPATIENT
Start: 2019-10-19 | End: 2019-10-22

## 2019-10-19 RX ORDER — MAGNESIUM SULFATE 500 MG/ML
2 VIAL (ML) INJECTION
Qty: 40 | Refills: 0 | Status: DISCONTINUED | OUTPATIENT
Start: 2019-10-19 | End: 2019-10-19

## 2019-10-19 RX ORDER — SODIUM CHLORIDE 9 MG/ML
3 INJECTION INTRAMUSCULAR; INTRAVENOUS; SUBCUTANEOUS EVERY 8 HOURS
Refills: 0 | Status: DISCONTINUED | OUTPATIENT
Start: 2019-10-19 | End: 2019-10-22

## 2019-10-19 RX ADMIN — HEPARIN SODIUM 5000 UNIT(S): 5000 INJECTION INTRAVENOUS; SUBCUTANEOUS at 23:29

## 2019-10-19 RX ADMIN — Medication 50 GM/HR: at 01:59

## 2019-10-19 RX ADMIN — Medication 12 MILLIGRAM(S): at 01:31

## 2019-10-19 RX ADMIN — Medication 200 GRAM(S): at 01:37

## 2019-10-19 RX ADMIN — SODIUM CHLORIDE 1000 MILLILITER(S): 9 INJECTION, SOLUTION INTRAVENOUS at 00:41

## 2019-10-19 RX ADMIN — Medication 1 TABLET(S): at 18:41

## 2019-10-19 RX ADMIN — SODIUM CHLORIDE 3 MILLILITER(S): 9 INJECTION INTRAMUSCULAR; INTRAVENOUS; SUBCUTANEOUS at 17:23

## 2019-10-19 RX ADMIN — SODIUM CHLORIDE 50 MILLILITER(S): 9 INJECTION, SOLUTION INTRAVENOUS at 01:30

## 2019-10-19 RX ADMIN — SODIUM CHLORIDE 3 MILLILITER(S): 9 INJECTION INTRAMUSCULAR; INTRAVENOUS; SUBCUTANEOUS at 22:34

## 2019-10-19 RX ADMIN — Medication 50 GM/HR: at 07:50

## 2019-10-19 NOTE — PROGRESS NOTE ADULT - SUBJECTIVE AND OBJECTIVE BOX
R3 Note  HD#20    INTERVAL HPI/OVERNIGHT EVENTS: Pt seen and examined at bedside.  Pt with variable decelerations on EFM. Pt notes feeling one contraction.  Ambulating, passing flatus, tolerating regular diet, pain controlled with analgesia, urinating spontaneously  She denies nausea/vomiting/fever/chills/chest pain/SOB/dizziness.    MEDICATIONS  (STANDING):  betamethasone Injectable 12 milliGRAM(s) IntraMuscular every 24 hours  heparin  Injectable 5000 Unit(s) SubCutaneous every 12 hours  influenza   Vaccine 0.5 milliLiter(s) IntraMuscular once  magnesium sulfate  IVPB 4 Gram(s) IV Intermittent once  magnesium sulfate Infusion 2 Gm/Hr (50 mL/Hr) IV Continuous <Continuous>  prenatal multivitamin 1 Tablet(s) Oral daily    MEDICATIONS  (PRN):      12 point ROS negative except as outlined above    Vital Signs Last 24 Hrs  T(C): 36.3 (18 Oct 2019 22:15), Max: 36.8 (18 Oct 2019 01:42)  T(F): 97.3 (18 Oct 2019 22:15), Max: 98.3 (18 Oct 2019 01:42)  HR: 87 (19 Oct 2019 00:58) (68 - 98)  BP: 108/61 (18 Oct 2019 22:15) (97/55 - 111/65)  BP(mean): --  RR: 18 (18 Oct 2019 22:15) (17 - 18)  SpO2: 100% (19 Oct 2019 00:58) (95% - 100%)    I&O's Summary        PHYSICAL EXAM:    GA: NAD, A+0 x 3  CV: RRR  Pulm: CTA BL  Abd: soft, nontender, nondistended, no rebound or guarding,   : light green discharge  SVE: cervix closed, no tension on the stitch  Extremities: no swelling or calf tenderness    EFM: 130, mod rosendo, 10x10 accels, variable decelerations  [place]: one ctx noted on toco  Lines:      LABS:                    RADIOLOGY & ADDITIONAL TESTS:

## 2019-10-19 NOTE — PROGRESS NOTE ADULT - ATTENDING COMMENTS
Attending Note   Pt seen and examined with PGY 3 Dr. Sofia   PT was on NST on 4 [place] and noted to have decels and occasional contractions  Pt transferred to L & D   PT reports occasional contractions, no fevers, no chills, no foul smelling discharge  Last ate at 8 pm on 10/18  Afebrile   HR 85  Abd soft, fundus nontender  Ext: no c/c/e  FHTs 150   TOCO irregular   BSUS breech  PPROM now at 27.5, no clinical evidence of chorio at this time   Reviewed with pt concern regarding FHT tracing and will need classical c/section if worsening maternal or fetal status   Reviewed with pt need for mg for neuroprotection  GBS neg   Rescue BMZ for FLM       NICU, anesthesia and nursing aware    Trinity Walsh MD MSc

## 2019-10-19 NOTE — CHART NOTE - NSCHARTNOTEFT_GEN_A_CORE
Pt seen at bedside, as contractions were noted on the tocometer. The patient notes that she does not feel any contractions or vaginal pressure. Pt resting comfortably.    VS: /57  HR 76 T 36.3  Gen: NAD  Abd: non tender  SVE: deferred  EFM: 135, min rosendo, + 10x10 accels, intermittent variable decels  [place]: ctx q 2-4 minutes    -continue with intrauterine resuscitation with O2 and fluid bolus, left lateral positioning    Discussed with Dr. Cannon-Kannan Sofia MD PGY3

## 2019-10-20 DIAGNOSIS — O09.90 SUPERVISION OF HIGH RISK PREGNANCY, UNSPECIFIED, UNSPECIFIED TRIMESTER: ICD-10-CM

## 2019-10-20 LAB
BASOPHILS # BLD AUTO: 0.03 K/UL — SIGNIFICANT CHANGE UP (ref 0–0.2)
BASOPHILS NFR BLD AUTO: 0.3 % — SIGNIFICANT CHANGE UP (ref 0–2)
EOSINOPHIL # BLD AUTO: 0.01 K/UL — SIGNIFICANT CHANGE UP (ref 0–0.5)
EOSINOPHIL NFR BLD AUTO: 0.1 % — SIGNIFICANT CHANGE UP (ref 0–6)
HCT VFR BLD CALC: 29.9 % — LOW (ref 34.5–45)
HGB BLD-MCNC: 9.2 G/DL — LOW (ref 11.5–15.5)
IMM GRANULOCYTES NFR BLD AUTO: 3.5 % — HIGH (ref 0–1.5)
LYMPHOCYTES # BLD AUTO: 1.31 K/UL — SIGNIFICANT CHANGE UP (ref 1–3.3)
LYMPHOCYTES # BLD AUTO: 11.2 % — LOW (ref 13–44)
MCHC RBC-ENTMCNC: 24.9 PG — LOW (ref 27–34)
MCHC RBC-ENTMCNC: 30.8 % — LOW (ref 32–36)
MCV RBC AUTO: 80.8 FL — SIGNIFICANT CHANGE UP (ref 80–100)
MONOCYTES # BLD AUTO: 0.5 K/UL — SIGNIFICANT CHANGE UP (ref 0–0.9)
MONOCYTES NFR BLD AUTO: 4.3 % — SIGNIFICANT CHANGE UP (ref 2–14)
NEUTROPHILS # BLD AUTO: 9.44 K/UL — HIGH (ref 1.8–7.4)
NEUTROPHILS NFR BLD AUTO: 80.6 % — HIGH (ref 43–77)
NRBC # FLD: 0 K/UL — SIGNIFICANT CHANGE UP (ref 0–0)
PLATELET # BLD AUTO: 239 K/UL — SIGNIFICANT CHANGE UP (ref 150–400)
PMV BLD: 12.1 FL — SIGNIFICANT CHANGE UP (ref 7–13)
RBC # BLD: 3.7 M/UL — LOW (ref 3.8–5.2)
RBC # FLD: 15.8 % — HIGH (ref 10.3–14.5)
WBC # BLD: 11.7 K/UL — HIGH (ref 3.8–10.5)
WBC # FLD AUTO: 11.7 K/UL — HIGH (ref 3.8–10.5)

## 2019-10-20 RX ORDER — POLYETHYLENE GLYCOL 3350 17 G/17G
17 POWDER, FOR SOLUTION ORAL DAILY
Refills: 0 | Status: DISCONTINUED | OUTPATIENT
Start: 2019-10-20 | End: 2019-10-22

## 2019-10-20 RX ORDER — DOCUSATE SODIUM 100 MG
100 CAPSULE ORAL THREE TIMES A DAY
Refills: 0 | Status: DISCONTINUED | OUTPATIENT
Start: 2019-10-20 | End: 2019-10-22

## 2019-10-20 RX ADMIN — HEPARIN SODIUM 5000 UNIT(S): 5000 INJECTION INTRAVENOUS; SUBCUTANEOUS at 10:43

## 2019-10-20 RX ADMIN — POLYETHYLENE GLYCOL 3350 17 GRAM(S): 17 POWDER, FOR SOLUTION ORAL at 11:24

## 2019-10-20 RX ADMIN — Medication 1 TABLET(S): at 10:43

## 2019-10-20 RX ADMIN — SODIUM CHLORIDE 3 MILLILITER(S): 9 INJECTION INTRAMUSCULAR; INTRAVENOUS; SUBCUTANEOUS at 21:11

## 2019-10-20 RX ADMIN — Medication 100 MILLIGRAM(S): at 11:24

## 2019-10-20 RX ADMIN — Medication 100 MILLIGRAM(S): at 21:58

## 2019-10-20 RX ADMIN — Medication 12 MILLIGRAM(S): at 01:07

## 2019-10-20 RX ADMIN — SODIUM CHLORIDE 3 MILLILITER(S): 9 INJECTION INTRAMUSCULAR; INTRAVENOUS; SUBCUTANEOUS at 10:39

## 2019-10-20 RX ADMIN — HEPARIN SODIUM 5000 UNIT(S): 5000 INJECTION INTRAVENOUS; SUBCUTANEOUS at 21:58

## 2019-10-20 NOTE — PROGRESS NOTE ADULT - ASSESSMENT
35 yo  27w6d with PNC s/f IUGR and GDMA1, now HD#21 a/w PPROM @ 24w5d after cerclage placement on 9/10/19.

## 2019-10-20 NOTE — PROGRESS NOTE ADULT - PROBLEM SELECTOR PLAN 3
#IUGR/FWB  - s/p BMZ (9/30-10/1) and rescue (10/19-20)  - [facility] 10/14: she garcia, oligo (MVP 2.2)  - [facility] 10/10: vtx, efw 603g (4%)  - 2h NST bid  - BPP twice weekly (M/Th)    #MWB  - ADA reg diet, SLIV  - ambulate as tolerated  - pnv  - HSQ for vte ppx

## 2019-10-20 NOTE — PROGRESS NOTE ADULT - SUBJECTIVE AND OBJECTIVE BOX
R3 ANTEPARTUM PROGRESS NOTE - HD#21    SUBJECTIVE:  Pt seen and evaluated at bedside.  She denies VB/LOF/CTX.  She reports good FM.  NST o/n reactive.  She is w/o complaints.  Denies CP/SOB, fevers/chills, N/V.  She is tolerating reg diet, voiding spontaneously, ambulating w/o difficulty, and denies pain.    OBJECTIVE:  Vital Signs Last 24 Hours  T(C): 37.1 (10-20-19 @ 01:10), Max: 37.1 (10-19-19 @ 10:59)  HR: 83 (10-19-19 @ 22:14) (76 - 108)  BP: 97/57 (10-19-19 @ 22:14) (83/49 - 118/64)  RR: 19 (10-19-19 @ 22:14) (16 - 19)  SpO2: 99% (10-19-19 @ 22:14) (93% - 100%)    EXAM:  GEN: nad, a&ox3  ABD: soft, NT, gravid  : no blood on pad  EXT: wwp, nontender    I&O's Summary    18 Oct 2019 07:01  -  19 Oct 2019 07:00  --------------------------------------------------------  IN: 350 mL / OUT: 400 mL / NET: -50 mL    Labs:                        9.6    12.62 )-----------( 233      ( 19 Oct 2019 01:30 )             30.2   baso 0.2    eos 1.3    imm gran 1.7    lymph 17.3   mono 6.5    poly 73.0       Mg     5.1     10-19      Blood Type: A Positive      MEDICATIONS  (STANDING):  heparin  Injectable 5000 Unit(s) SubCutaneous every 12 hours  influenza   Vaccine 0.5 milliLiter(s) IntraMuscular once  prenatal multivitamin 1 Tablet(s) Oral daily  sodium chloride 0.9% lock flush 3 milliLiter(s) IV Push every 8 hours    MEDICATIONS  (PRN):  calcium carbonate    500 mG (Tums) Chewable 1 Tablet(s) Chew every 6 hours PRN Heartburn R3 ANTEPARTUM PROGRESS NOTE - HD#21    SUBJECTIVE:  Pt seen and evaluated at bedside.  She denies VB/CTX.  She continues to have leakage of clear-yellow/green fluid.	  She reports good FM.  NST o/n reactive.  She is w/o complaints.  Denies CP/SOB, fevers/chills, N/V.  She is tolerating reg diet, voiding spontaneously, ambulating w/o difficulty, and denies pain.    OBJECTIVE:  Vital Signs Last 24 Hours  T(C): 37.1 (10-20-19 @ 01:10), Max: 37.1 (10-19-19 @ 10:59)  HR: 83 (10-19-19 @ 22:14) (76 - 108)  BP: 97/57 (10-19-19 @ 22:14) (83/49 - 118/64)  RR: 19 (10-19-19 @ 22:14) (16 - 19)  SpO2: 99% (10-19-19 @ 22:14) (93% - 100%)    EXAM:  GEN: nad, a&ox3  ABD: soft, NT, gravid  : no blood on pad  EXT: wwp, nontender    I&O's Summary    18 Oct 2019 07:01  -  19 Oct 2019 07:00  --------------------------------------------------------  IN: 350 mL / OUT: 400 mL / NET: -50 mL    Labs:                        9.6    12.62 )-----------( 233      ( 19 Oct 2019 01:30 )             30.2   baso 0.2    eos 1.3    imm gran 1.7    lymph 17.3   mono 6.5    poly 73.0       Mg     5.1     10-19      Blood Type: A Positive      MEDICATIONS  (STANDING):  heparin  Injectable 5000 Unit(s) SubCutaneous every 12 hours  influenza   Vaccine 0.5 milliLiter(s) IntraMuscular once  prenatal multivitamin 1 Tablet(s) Oral daily  sodium chloride 0.9% lock flush 3 milliLiter(s) IV Push every 8 hours    MEDICATIONS  (PRN):  calcium carbonate    500 mG (Tums) Chewable 1 Tablet(s) Chew every 6 hours PRN Heartburn

## 2019-10-21 ENCOUNTER — OUTPATIENT (OUTPATIENT)
Dept: OUTPATIENT SERVICES | Facility: HOSPITAL | Age: 34
LOS: 1 days | End: 2019-10-21

## 2019-10-21 ENCOUNTER — ASOB RESULT (OUTPATIENT)
Age: 34
End: 2019-10-21

## 2019-10-21 ENCOUNTER — RESULT REVIEW (OUTPATIENT)
Age: 34
End: 2019-10-21

## 2019-10-21 ENCOUNTER — TRANSCRIPTION ENCOUNTER (OUTPATIENT)
Age: 34
End: 2019-10-21

## 2019-10-21 ENCOUNTER — APPOINTMENT (OUTPATIENT)
Dept: ANTEPARTUM | Facility: HOSPITAL | Age: 34
End: 2019-10-21
Payer: COMMERCIAL

## 2019-10-21 DIAGNOSIS — Z98.890 OTHER SPECIFIED POSTPROCEDURAL STATES: Chronic | ICD-10-CM

## 2019-10-21 DIAGNOSIS — Z90.49 ACQUIRED ABSENCE OF OTHER SPECIFIED PARTS OF DIGESTIVE TRACT: Chronic | ICD-10-CM

## 2019-10-21 LAB
BASOPHILS # BLD AUTO: 0.07 K/UL — SIGNIFICANT CHANGE UP (ref 0–0.2)
BASOPHILS NFR BLD AUTO: 0.5 % — SIGNIFICANT CHANGE UP (ref 0–2)
EOSINOPHIL # BLD AUTO: 0.09 K/UL — SIGNIFICANT CHANGE UP (ref 0–0.5)
EOSINOPHIL NFR BLD AUTO: 0.7 % — SIGNIFICANT CHANGE UP (ref 0–6)
HBV SURFACE AG SER-ACNC: NEGATIVE — SIGNIFICANT CHANGE UP
HCT VFR BLD CALC: 29.4 % — LOW (ref 34.5–45)
HGB BLD-MCNC: 9.3 G/DL — LOW (ref 11.5–15.5)
IMM GRANULOCYTES NFR BLD AUTO: 3.6 % — HIGH (ref 0–1.5)
LYMPHOCYTES # BLD AUTO: 17.3 % — SIGNIFICANT CHANGE UP (ref 13–44)
LYMPHOCYTES # BLD AUTO: 2.25 K/UL — SIGNIFICANT CHANGE UP (ref 1–3.3)
MCHC RBC-ENTMCNC: 25.2 PG — LOW (ref 27–34)
MCHC RBC-ENTMCNC: 31.6 % — LOW (ref 32–36)
MCV RBC AUTO: 79.7 FL — LOW (ref 80–100)
MONOCYTES # BLD AUTO: 1.12 K/UL — HIGH (ref 0–0.9)
MONOCYTES NFR BLD AUTO: 8.6 % — SIGNIFICANT CHANGE UP (ref 2–14)
NEUTROPHILS # BLD AUTO: 8.99 K/UL — HIGH (ref 1.8–7.4)
NEUTROPHILS NFR BLD AUTO: 69.3 % — SIGNIFICANT CHANGE UP (ref 43–77)
NRBC # FLD: 0.02 K/UL — SIGNIFICANT CHANGE UP (ref 0–0)
PLATELET # BLD AUTO: 243 K/UL — SIGNIFICANT CHANGE UP (ref 150–400)
PMV BLD: 11.9 FL — SIGNIFICANT CHANGE UP (ref 7–13)
RBC # BLD: 3.69 M/UL — LOW (ref 3.8–5.2)
RBC # FLD: 16 % — HIGH (ref 10.3–14.5)
WBC # BLD: 12.99 K/UL — HIGH (ref 3.8–10.5)
WBC # FLD AUTO: 12.99 K/UL — HIGH (ref 3.8–10.5)

## 2019-10-21 PROCEDURE — 76820 UMBILICAL ARTERY ECHO: CPT | Mod: 26

## 2019-10-21 PROCEDURE — 76819 FETAL BIOPHYS PROFIL W/O NST: CPT | Mod: 26

## 2019-10-21 RX ORDER — FAMOTIDINE 10 MG/ML
20 INJECTION INTRAVENOUS ONCE
Refills: 0 | Status: COMPLETED | OUTPATIENT
Start: 2019-10-21 | End: 2019-10-21

## 2019-10-21 RX ORDER — METOCLOPRAMIDE HCL 10 MG
10 TABLET ORAL ONCE
Refills: 0 | Status: COMPLETED | OUTPATIENT
Start: 2019-10-21 | End: 2019-10-21

## 2019-10-21 RX ORDER — SODIUM CHLORIDE 9 MG/ML
500 INJECTION, SOLUTION INTRAVENOUS ONCE
Refills: 0 | Status: COMPLETED | OUTPATIENT
Start: 2019-10-21 | End: 2019-10-21

## 2019-10-21 RX ORDER — CITRIC ACID/SODIUM CITRATE 300-500 MG
30 SOLUTION, ORAL ORAL ONCE
Refills: 0 | Status: COMPLETED | OUTPATIENT
Start: 2019-10-21 | End: 2019-10-21

## 2019-10-21 RX ADMIN — SODIUM CHLORIDE 3 MILLILITER(S): 9 INJECTION INTRAMUSCULAR; INTRAVENOUS; SUBCUTANEOUS at 06:06

## 2019-10-21 RX ADMIN — Medication 100 MILLIGRAM(S): at 12:58

## 2019-10-21 RX ADMIN — SODIUM CHLORIDE 1000 MILLILITER(S): 9 INJECTION, SOLUTION INTRAVENOUS at 23:57

## 2019-10-21 RX ADMIN — Medication 1 TABLET(S): at 11:29

## 2019-10-21 RX ADMIN — SODIUM CHLORIDE 3 MILLILITER(S): 9 INJECTION INTRAMUSCULAR; INTRAVENOUS; SUBCUTANEOUS at 12:02

## 2019-10-21 RX ADMIN — Medication 10 MILLIGRAM(S): at 23:53

## 2019-10-21 RX ADMIN — SODIUM CHLORIDE 3 MILLILITER(S): 9 INJECTION INTRAMUSCULAR; INTRAVENOUS; SUBCUTANEOUS at 22:05

## 2019-10-21 RX ADMIN — FAMOTIDINE 20 MILLIGRAM(S): 10 INJECTION INTRAVENOUS at 23:53

## 2019-10-21 RX ADMIN — HEPARIN SODIUM 5000 UNIT(S): 5000 INJECTION INTRAVENOUS; SUBCUTANEOUS at 11:29

## 2019-10-21 RX ADMIN — Medication 30 MILLILITER(S): at 23:57

## 2019-10-21 RX ADMIN — Medication 100 MILLIGRAM(S): at 06:06

## 2019-10-21 NOTE — CHART NOTE - NSCHARTNOTEFT_GEN_A_CORE
R3 note    Pt seen and examined at bedside feeling contractions every 3-5 minutes.    VS: /59  HR 65  T 36.8  TAS: breech  SVE: closed, stitch in placed  EFM: 140, mod min rosendo, - accels, recurrent variable decels  [place]: ctx not picking up on toco, however, patient reported them q3-5 minutes      Pt seen and examined with Dr. Aparicio. Will continue with  section for nonreassuring fetal heart rate tracing. Pt consented and in agreement with the plan.    Leonardo Sofia MD PGY3 R3 note    Pt seen and examined at bedside feeling contractions every 3-5 minutes.    VS: /59  HR 65  T 36.8  TAS: breech  SVE: closed, stitch in placed  EFM: 140, mod min rosendo, - accels, recurrent variable decels  [place]: ctx not picking up on toco, however, patient reported them q3-5 minutes      Pt seen and examined with Dr. Aparicio. Will continue with  section for nonreassuring fetal heart rate tracing. Pt consented and in agreement with the plan.    Leonardo Sofia MD PGY3    Pt seen with resident c/o regular ctxs.  FHT category II.  Pt sp rescue course of Betamethasone.  Will proceed with  section and cerclage removal at this time.     Arabella Aparicio MD

## 2019-10-21 NOTE — PROGRESS NOTE ADULT - PROBLEM SELECTOR PLAN 2
#GDMA1  - FS moderately controlled with diet modification. Fasting 96, postprandial elevation 157.  - continue FS fasting and 1h postprandial  - continue ADA reg diet.

## 2019-10-21 NOTE — PROGRESS NOTE ADULT - SUBJECTIVE AND OBJECTIVE BOX
R3 Antepartum Note-HD#22    Patient seen and examined at bedside, no acute overnight events. No acute complaints. She continues to have leakage of clear/yellow fluid. Pt reports +FM, denies LOF, VB, ctx, CP, SOB, N/V, fevers, and chills. Denies HA, blurry vision, RUQ/epigastric pain, new onset swelling.     Vital Signs Last 24 Hours  T(C): 36.8 (10-21-19 @ 06:06), Max: 36.9 (10-20-19 @ 10:18)  HR: 76 (10-21-19 @ 06:06) (71 - 97)  BP: 95/55 (10-21-19 @ 06:06) (95/55 - 112/58)  RR: 16 (10-21-19 @ 06:06) (16 - 18)  SpO2: 99% (10-21-19 @ 06:06) (97% - 100%)    CAPILLARY BLOOD GLUCOSE      POCT Blood Glucose.: 96 mg/dL (21 Oct 2019 07:52)  POCT Blood Glucose.: 126 mg/dL (20 Oct 2019 19:58)  POCT Blood Glucose.: 139 mg/dL (20 Oct 2019 13:56)  POCT Blood Glucose.: 157 mg/dL (20 Oct 2019 10:07)      Physical Exam:  General: NAD  Abdomen: Soft, non-tender, gravid  Ext: No pain or swelling    Labs:             9.2    11.70 )-----------( 239      ( 10-20 @ 06:50 )             29.9       Mg     5.1     10-19 @ 13:20              MEDICATIONS  (STANDING):  docusate sodium 100 milliGRAM(s) Oral three times a day  heparin  Injectable 5000 Unit(s) SubCutaneous every 12 hours  influenza   Vaccine 0.5 milliLiter(s) IntraMuscular once  polyethylene glycol 3350 17 Gram(s) Oral daily  prenatal multivitamin 1 Tablet(s) Oral daily  sodium chloride 0.9% lock flush 3 milliLiter(s) IV Push every 8 hours    MEDICATIONS  (PRN):  calcium carbonate    500 mG (Tums) Chewable 1 Tablet(s) Chew every 6 hours PRN Heartburn

## 2019-10-21 NOTE — PROGRESS NOTE ADULT - PROBLEM SELECTOR PLAN 3
#IUGR/FWB  - s/p BMZ (9/30-10/1) and rescue (10/19-20)  - [facility] sono 10/14: she garcia, oligo (MVP 2.2)  - [facility] 10/10: vtx, efw 603g (4%)  - 2h NST bid  - BPP twice weekly (M/Th)  #MWB  - ADA reg diet, SLIV  - ambulate as tolerated  - pnv  - HSQ for vte ppx.

## 2019-10-21 NOTE — PROGRESS NOTE ADULT - ASSESSMENT
33 yo  28w0d with PNC s/f IUGR and GDMA1, now HD#22 a/w PPROM @ 24w5d after cerclage placement on 9/10/19.

## 2019-10-22 PROCEDURE — 88307 TISSUE EXAM BY PATHOLOGIST: CPT | Mod: 26

## 2019-10-22 RX ORDER — MAGNESIUM HYDROXIDE 400 MG/1
30 TABLET, CHEWABLE ORAL
Refills: 0 | Status: DISCONTINUED | OUTPATIENT
Start: 2019-10-22 | End: 2019-10-25

## 2019-10-22 RX ORDER — ACETAMINOPHEN 500 MG
975 TABLET ORAL
Refills: 0 | Status: DISCONTINUED | OUTPATIENT
Start: 2019-10-22 | End: 2019-10-25

## 2019-10-22 RX ORDER — GLYCERIN ADULT
1 SUPPOSITORY, RECTAL RECTAL AT BEDTIME
Refills: 0 | Status: DISCONTINUED | OUTPATIENT
Start: 2019-10-22 | End: 2019-10-25

## 2019-10-22 RX ORDER — IBUPROFEN 200 MG
600 TABLET ORAL EVERY 6 HOURS
Refills: 0 | Status: COMPLETED | OUTPATIENT
Start: 2019-10-22 | End: 2020-09-19

## 2019-10-22 RX ORDER — TETANUS TOXOID, REDUCED DIPHTHERIA TOXOID AND ACELLULAR PERTUSSIS VACCINE, ADSORBED 5; 2.5; 8; 8; 2.5 [IU]/.5ML; [IU]/.5ML; UG/.5ML; UG/.5ML; UG/.5ML
0.5 SUSPENSION INTRAMUSCULAR ONCE
Refills: 0 | Status: DISCONTINUED | OUTPATIENT
Start: 2019-10-22 | End: 2019-10-25

## 2019-10-22 RX ORDER — HEPARIN SODIUM 5000 [USP'U]/ML
5000 INJECTION INTRAVENOUS; SUBCUTANEOUS EVERY 12 HOURS
Refills: 0 | Status: DISCONTINUED | OUTPATIENT
Start: 2019-10-22 | End: 2019-10-25

## 2019-10-22 RX ORDER — SIMETHICONE 80 MG/1
80 TABLET, CHEWABLE ORAL EVERY 4 HOURS
Refills: 0 | Status: DISCONTINUED | OUTPATIENT
Start: 2019-10-22 | End: 2019-10-25

## 2019-10-22 RX ORDER — OXYTOCIN 10 UNIT/ML
333.33 VIAL (ML) INJECTION
Qty: 20 | Refills: 0 | Status: DISCONTINUED | OUTPATIENT
Start: 2019-10-22 | End: 2019-10-25

## 2019-10-22 RX ORDER — DOCUSATE SODIUM 100 MG
100 CAPSULE ORAL
Refills: 0 | Status: DISCONTINUED | OUTPATIENT
Start: 2019-10-22 | End: 2019-10-25

## 2019-10-22 RX ORDER — OXYCODONE HYDROCHLORIDE 5 MG/1
5 TABLET ORAL ONCE
Refills: 0 | Status: DISCONTINUED | OUTPATIENT
Start: 2019-10-22 | End: 2019-10-25

## 2019-10-22 RX ORDER — LANOLIN
1 OINTMENT (GRAM) TOPICAL EVERY 6 HOURS
Refills: 0 | Status: DISCONTINUED | OUTPATIENT
Start: 2019-10-22 | End: 2019-10-25

## 2019-10-22 RX ORDER — SODIUM CHLORIDE 9 MG/ML
1000 INJECTION, SOLUTION INTRAVENOUS
Refills: 0 | Status: DISCONTINUED | OUTPATIENT
Start: 2019-10-22 | End: 2019-10-22

## 2019-10-22 RX ORDER — KETOROLAC TROMETHAMINE 30 MG/ML
30 SYRINGE (ML) INJECTION EVERY 6 HOURS
Refills: 0 | Status: DISCONTINUED | OUTPATIENT
Start: 2019-10-22 | End: 2019-10-22

## 2019-10-22 RX ORDER — DIPHENHYDRAMINE HCL 50 MG
25 CAPSULE ORAL EVERY 6 HOURS
Refills: 0 | Status: DISCONTINUED | OUTPATIENT
Start: 2019-10-22 | End: 2019-10-25

## 2019-10-22 RX ORDER — OXYCODONE HYDROCHLORIDE 5 MG/1
5 TABLET ORAL
Refills: 0 | Status: COMPLETED | OUTPATIENT
Start: 2019-10-22 | End: 2019-10-29

## 2019-10-22 RX ADMIN — SODIUM CHLORIDE 125 MILLILITER(S): 9 INJECTION, SOLUTION INTRAVENOUS at 02:53

## 2019-10-22 RX ADMIN — Medication 1000 MILLIUNIT(S)/MIN: at 02:53

## 2019-10-22 RX ADMIN — HEPARIN SODIUM 5000 UNIT(S): 5000 INJECTION INTRAVENOUS; SUBCUTANEOUS at 18:32

## 2019-10-22 RX ADMIN — Medication 30 MILLIGRAM(S): at 20:34

## 2019-10-22 RX ADMIN — Medication 30 MILLIGRAM(S): at 21:30

## 2019-10-22 RX ADMIN — HEPARIN SODIUM 5000 UNIT(S): 5000 INJECTION INTRAVENOUS; SUBCUTANEOUS at 06:21

## 2019-10-22 NOTE — BRIEF OPERATIVE NOTE - OPERATION/FINDINGS
viable female infant, APGARS 6/9, breech presentation, 960g, grossly normal uterus, tubes and ovaries

## 2019-10-22 NOTE — BRIEF OPERATIVE NOTE - NSICDXBRIEFPOSTOP_GEN_ALL_CORE_FT
POST-OP DIAGNOSIS:  Breech presentation 22-Oct-2019 07:21:37  Leonardo Sofia  Abnormal fetal heart rate affecting pregnancy 22-Oct-2019 07:21:16  Leonardo Sofia   premature rupture of membranes (PPROM) with unknown onset of labor 22-Oct-2019 07:21:00  Leonardo Sofia  Active  labor 22-Oct-2019 07:20:52  Leonardo Sofia

## 2019-10-22 NOTE — OB NEONATOLOGY/PEDIATRICIAN DELIVERY SUMMARY - NSHXCHECK_OBGYN_ALL_OB
Star Wedge Flap Text: The defect edges were debeveled with a #15 scalpel blade. Given the location of the defect, shape of the defect and the proximity to free margins a star wedge flap was deemed most appropriate. Using a sterile surgical marker, an appropriate rotation flap was drawn incorporating the defect and placing the expected incisions within the relaxed skin tension lines where possible. The area thus outlined was incised deep to adipose tissue with a #15 scalpel blade. The skin margins were undermined to an appropriate distance in all directions utilizing iris scissors. No Quadrants Reporting?: 0 Surgeon: Magdaleno Schwab, MD Stage 2: Additional Anesthesia Type: 2% lidocaine with epinephrine Oculoplastic Surgeon Procedure Text (D): After obtaining clear surgical margins the patient was sent to oculoplastics for surgical repair. The patient understands they will receive post-surgical care and follow-up from the referring physician's office. Show Patient Name Variable: Yes Complex Repair And Flap Additional Text (Will Appearing After The Standard Complex Repair Text): The complex repair was not sufficient to completely close the primary defect. The remaining additional defect was repaired with the flap mentioned below. Alar Island Pedicle Flap Text: The defect edges were debeveled with a #15 scalpel blade. Given the location of the defect, shape of the defect and the proximity to the alar rim an island pedicle advancement flap was deemed most appropriate. Using a sterile surgical marker, an appropriate advancement flap was drawn incorporating the defect, outlining the appropriate donor tissue and placing the expected incisions within the nasal ala running parallel to the alar rim. The area thus outlined was incised with a #15 scalpel blade. The skin margins were undermined minimally to an appropriate distance in all directions around the primary defect and laterally outward around the island pedicle utilizing iris scissors. There was minimal undermining beneath the pedicle flap. Perineural Invasion (For Histology - Be Specific If Possible): absent Referred To Oculoplastics For Closure Text (Leave Blank If You Do Not Want): After obtaining clear surgical margins the patient was sent to oculoplastics for surgical repair.  The patient understands they will receive post-surgical care and follow-up from the referring physician's office. Validate Referring Provider (Can Hide Referring Provider In Settings Tab): No Consent 3/Introductory Paragraph: I gave the patient a chance to ask questions they had about the procedure. Following this I explained the Mohs procedure and consent was obtained. The risks, benefits and alternatives to therapy were discussed in detail. Specifically, the risks of infection, scarring, bleeding, prolonged wound healing, incomplete removal, allergy to anesthesia, nerve injury and recurrence were addressed. Prior to the procedure, the treatment site was clearly identified and confirmed by the patient. All components of Universal Protocol/PAUSE Rule completed. X Size Of Lesion In Cm (Optional): 1.1 Posterior Auricular Interpolation Flap Text: A decision was made to reconstruct the defect utilizing an interpolation axial flap and a staged reconstruction. A telfa template was made of the defect. This telfa template was then used to outline the posterior auricular interpolation flap. The donor area for the pedicle flap was then injected with anesthesia. The flap was excised through the skin and subcutaneous tissue down to the layer of the underlying musculature. The pedicle flap was carefully excised within this deep plane to maintain its blood supply. The edges of the donor site were undermined. The donor site was closed in a primary fashion. The pedicle was then rotated into position and sutured. Once the tube was sutured into place, adequate blood supply was confirmed with blanching and refill. The pedicle was then wrapped with xeroform gauze and dressed appropriately with a telfa and gauze bandage to ensure continued blood supply and protect the attached pedicle. Mucosal Advancement Flap Text: Given the location of the defect, shape of the defect and the proximity to free margins a mucosal advancement flap was deemed most appropriate. Incisions were made with a 15 blade scalpel in the appropriate fashion along the cutaneous vermilion border and the mucosal lip. The remaining actinically damaged mucosal tissue was excised. The mucosal advancement flap was then elevated to the gingival sulcus with care taken to preserve the neurovascular structures and advanced into the primary defect. Care was taken to ensure that precise realignment of the vermilion border was achieved. Stage 15: Additional Anesthesia Type: 1% lidocaine with epinephrine Mohs Method Verbiage: An incision at a 45 degree angle following the standard Mohs approach was done and the specimen was harvested as a microscopic controlled layer. Epidermal Closure: running Consent Type: Consent 1 (Standard) Mid-Level Procedure Text (D): After obtaining clear surgical margins the patient was sent to a mid-level provider for surgical repair. The patient understands they will receive post-surgical care and follow-up from the mid-level provider. Partial Purse String (Simple) Text: Given the location of the defect and the characteristics of the surrounding skin a simple purse string closure was deemed most appropriate. Undermining was performed circumfirentially around the surgical defect. A purse string suture was then placed and tightened. Wound tension only allowed a partial closure of the circular defect. Trilobed Flap Text: The defect edges were debeveled with a #15 scalpel blade. Given the location of the defect and the proximity to free margins a trilobed flap was deemed most appropriate. Using a sterile surgical marker, an appropriate trilobed flap drawn around the defect. The area thus outlined was incised deep to adipose tissue with a #15 scalpel blade. The skin margins were undermined to an appropriate distance in all directions utilizing iris scissors. Non-Graft Cartilage Fenestration Text: The cartilage was fenestrated with a 2mm punch biopsy to help facilitate healing. Epidermal Closure Graft Donor Site (Optional): none-secondary intention Medical Necessity Statement: Based on my medical judgement, standard excision and/or destruction technique methods are not clinically sufficient treatment options  . To prevent the risk of compromising surgical cure and reconstruction; prompt microscopic examination of the surgical margins is necessary. Based on the given indication(s), maximum conservation of healthy tissue, and high cure rate, Mohs surgery is the most appropriate treatment for this cancer. Repair Type: Complex Repair Plastic Surgeon Procedure Text (A): After obtaining clear surgical margins the patient was sent to plastics for surgical repair. The patient understands they will receive post-surgical care and follow-up from the referring physician's office. Dressing: dry sterile dressing Cheek-To-Nose Interpolation Flap Text: A decision was made to reconstruct the defect utilizing an interpolation axial flap and a staged reconstruction. A telfa template was made of the defect. This telfa template was then used to outline the Cheek-To-Nose Interpolation flap. The donor area for the pedicle flap was then injected with anesthesia. The flap was excised through the skin and subcutaneous tissue down to the layer of the underlying musculature. The interpolation flap was carefully excised within this deep plane to maintain its blood supply. The edges of the donor site were undermined. The donor site was closed in a primary fashion. The pedicle was then rotated into position and sutured. Once the tube was sutured into place, adequate blood supply was confirmed with blanching and refill. The pedicle was then wrapped with xeroform gauze and dressed appropriately with a telfa and gauze bandage to ensure continued blood supply and protect the attached pedicle. V-Y Flap Text: The defect edges were debeveled with a #15 scalpel blade. Given the location of the defect, shape of the defect and the proximity to free margins a V-Y flap was deemed most appropriate. Using a sterile surgical marker, an appropriate advancement flap was drawn incorporating the defect and placing the expected incisions within the relaxed skin tension lines where possible. The area thus outlined was incised deep to adipose tissue with a #15 scalpel blade. The skin margins were undermined to an appropriate distance in all directions utilizing iris scissors. Previous Accession (Optional): [id number] Date Of Previous Biopsy (Optional): 3-5-19 Skin Substitute Text: The defect edges were debeveled with a #15 scalpel blade. Given the location of the defect, shape of the defect and the proximity to free margins a skin substitute graft was deemed most appropriate. The graft material was trimmed to fit the size of the defect. The graft was then placed in the primary defect and oriented appropriately. Double M-Plasty Complex Repair Preamble Text (Leave Blank If You Do Not Want): Extensive wide undermining was performed. Bilateral Helical Rim Advancement Flap Text: The defect edges were debeveled with a #15 blade scalpel. Given the location of the defect and the proximity to free margins (helical rim) a bilateral helical rim advancement flap was deemed most appropriate. Using a sterile surgical marker, the appropriate advancement flaps were drawn incorporating the defect and placing the expected incisions between the helical rim and antihelix where possible. The area thus outlined was incised through and through with a #15 scalpel blade. With a skin hook and iris scissors, the flaps were gently and sharply undermined and freed up. Area L Indication Text: Tumors in this location are included in Area L (trunk and extremities). Mohs surgery is indicated for larger tumors, or tumors with aggressive histologic features, in these anatomic locations. Asc Procedure Text (B): After obtaining clear surgical margins the patient was sent to an [facility] for surgical repair. The patient understands they will receive post-surgical care and follow-up from the [facility] physician. Hemostasis: Electrocautery Plastic Surgeon Procedure Text (F): After obtaining clear surgical margins the patient was sent to plastics for surgical repair.  The patient understands they will receive post-surgical care and follow-up from the referring physician's office. Burow's Advancement Flap Text: The defect edges were debeveled with a #15 scalpel blade. Given the location of the defect and the proximity to free margins a Burow's advancement flap was deemed most appropriate. Using a sterile surgical marker, the appropriate advancement flap was drawn incorporating the defect and placing the expected incisions within the relaxed skin tension lines where possible. The area thus outlined was incised deep to adipose tissue with a #15 scalpel blade. The skin margins were undermined to an appropriate distance in all directions utilizing iris scissors. V-Y Plasty Text: The defect edges were debeveled with a #15 scalpel blade. Given the location of the defect, shape of the defect and the proximity to free margins an V-Y advancement flap was deemed most appropriate. Using a sterile surgical marker, an appropriate advancement flap was drawn incorporating the defect and placing the expected incisions within the relaxed skin tension lines where possible. The area thus outlined was incised deep to adipose tissue with a #15 scalpel blade. The skin margins were undermined to an appropriate distance in all directions utilizing iris scissors. Consent (Scalp)/Introductory Paragraph: The rationale for Mohs was explained to the patient and consent was obtained. The risks, benefits and alternatives to therapy were discussed in detail. Specifically, the risks of changes in hair growth pattern secondary to repair, infection, scarring, bleeding, prolonged wound healing, incomplete removal, allergy to anesthesia, nerve injury and recurrence were addressed. Prior to the procedure, the treatment site was clearly identified and confirmed by the patient. All components of Universal Protocol/PAUSE Rule completed. Otolaryngologist Procedure Text (C): After obtaining clear surgical margins the patient was sent to otolaryngology for surgical repair. The patient understands they will receive post-surgical care and follow-up from the referring physician's office. Cartilage Graft Text: The defect edges were debeveled with a #15 scalpel blade. Given the location of the defect, shape of the defect, the fact the defect involved a full thickness cartilage defect a cartilage graft was deemed most appropriate. An appropriate donor site was identified, cleansed, and anesthetized. The cartilage graft was then harvested and transferred to the recipient site, oriented appropriately and then sutured into place. The secondary defect was then repaired using a primary closure. Intermediate Repair Preamble Text (Leave Blank If You Do Not Want): Undermining was performed with blunt dissection. Rhombic Flap Text: The defect edges were debeveled with a #15 scalpel blade. Given the location of the defect and the proximity to free margins a rhombic flap was deemed most appropriate. Using a sterile surgical marker, an appropriate rhombic flap was drawn incorporating the defect. The area thus outlined was incised deep to adipose tissue with a #15 scalpel blade. The skin margins were undermined to an appropriate distance in all directions utilizing iris scissors. Asc Procedure Text (F): After obtaining clear surgical margins the patient was sent to an [facility] for surgical repair.  The patient understands they will receive post-surgical care and follow-up from the [facility] physician. Closure 4 Information: This tab is for additional flaps and grafts above and beyond our usual structured repairs. Please note if you enter information here it will not currently bill and you will need to add the billing information manually. O-T Plasty Text: The defect edges were debeveled with a #15 scalpel blade. Given the location of the defect, shape of the defect and the proximity to free margins an O-T plasty was deemed most appropriate. Using a sterile surgical marker, an appropriate O-T plasty was drawn incorporating the defect and placing the expected incisions within the relaxed skin tension lines where possible. The area thus outlined was incised deep to adipose tissue with a #15 scalpel blade. The skin margins were undermined to an appropriate distance in all directions utilizing iris scissors. Melanoma In Situ Histology Text: On a sun-damaged skin, there is a broad and asymmetrical proliferation of enlarged and atypical melanocytes present continuously as single cells and in small irregular coalescing nests along the dermoepidermal junction. The melanocytes extend into the superficial portions of epithelial structures of adnexa. Pagetoid spread of melanocytes is appreciated. Occasional mitotic figures and individually necrotic melanocytes are also noted. In the underlying papillary dermis, there is mild lymphocytic inflammatory infiltrate with prominent dermal fibroplasia and melanophages. Stage 1: Number Of Blocks?: 1 Referred To Mid-Level For Closure Text (Leave Blank If You Do Not Want): After obtaining clear surgical margins the patient was sent to a mid-level provider for surgical repair.  The patient understands they will receive post-surgical care and follow-up from the mid-level provider. Consent (Near Eyelid Margin)/Introductory Paragraph: The rationale for Mohs was explained to the patient and consent was obtained. The risks, benefits and alternatives to therapy were discussed in detail. Specifically, the risks of ectropion or eyelid deformity, infection, scarring, bleeding, prolonged wound healing, incomplete removal, allergy to anesthesia, nerve injury and recurrence were addressed. Prior to the procedure, the treatment site was clearly identified and confirmed by the patient. All components of Universal Protocol/PAUSE Rule completed. Provider Procedure Text (C): After obtaining clear surgical margins the defect was repaired by another provider. Ear Wedge Repair Text: A wedge excision was completed by carrying down an excision through the full thickness of the ear and cartilage with an inward facing Burow's triangle. The wound was then closed in a layered fashion. Inflammation Suggestive Of Cancer Camouflage Histology Text: There was a dense lymphocytic infiltrate which prevented adequate histologic evaluation of adjacent structures. Complex Repair And Graft Additional Text (Will Appearing After The Standard Complex Repair Text): The complex repair was not sufficient to completely close the primary defect. The remaining additional defect was repaired with the graft mentioned below. Double Island Pedicle Flap Text: The defect edges were debeveled with a #15 scalpel blade. Given the location of the defect, shape of the defect and the proximity to free margins a double island pedicle advancement flap was deemed most appropriate. Using a sterile surgical marker, an appropriate advancement flap was drawn incorporating the defect, outlining the appropriate donor tissue and placing the expected incisions within the relaxed skin tension lines where possible. The area thus outlined was incised deep to adipose tissue with a #15 scalpel blade. The skin margins were undermined to an appropriate distance in all directions around the primary defect and laterally outward around the island pedicle utilizing iris scissors. There was minimal undermining beneath the pedicle flap. Referred To Otolaryngology For Closure Text (Leave Blank If You Do Not Want): After obtaining clear surgical margins the patient was sent to otolaryngology for surgical repair.  The patient understands they will receive post-surgical care and follow-up from the referring physician's office. Consent (Temporal Branch)/Introductory Paragraph: The rationale for Mohs was explained to the patient and consent was obtained. The risks, benefits and alternatives to therapy were discussed in detail. Specifically, the risks of damage to the temporal branch of the facial nerve, infection, scarring, bleeding, prolonged wound healing, incomplete removal, allergy to anesthesia, and recurrence were addressed. Prior to the procedure, the treatment site was clearly identified and confirmed by the patient. All components of Universal Protocol/PAUSE Rule completed. Unna Boot Text: An Unna boot was placed to help immobilize the limb and facilitate more rapid healing. Paramedian Forehead Flap Text: A decision was made to reconstruct the defect utilizing an interpolation axial flap and a staged reconstruction. A telfa template was made of the defect. This telfa template was then used to outline the paramedian forehead pedicle flap. The donor area for the pedicle flap was then injected with anesthesia. The flap was excised through the skin and subcutaneous tissue down to the layer of the underlying musculature. The pedicle flap was carefully excised within this deep plane to maintain its blood supply. The edges of the donor site were undermined. The donor site was closed in a primary fashion. The pedicle was then rotated into position and sutured. Once the tube was sutured into place, adequate blood supply was confirmed with blanching and refill. The pedicle was then wrapped with xeroform gauze and dressed appropriately with a telfa and gauze bandage to ensure continued blood supply and protect the attached pedicle. Hatchet Flap Text: The defect edges were debeveled with a #15 scalpel blade. Given the location of the defect, shape of the defect and the proximity to free margins a hatchet flap was deemed most appropriate. Using a sterile surgical marker, an appropriate hatchet flap was drawn incorporating the defect and placing the expected incisions within the relaxed skin tension lines where possible. The area thus outlined was incised deep to adipose tissue with a #15 scalpel blade. The skin margins were undermined to an appropriate distance in all directions utilizing iris scissors. Surgeon/Pathologist Verbiage (Will Incorporate Name Of Surgeon From Intro If Not Blank): operated in two distinct and integrated capacities as the surgeon and pathologist. Partial Purse String (Intermediate) Text: Given the location of the defect and the characteristics of the surrounding skin an intermediate purse string closure was deemed most appropriate. Undermining was performed circumfirentially around the surgical defect. A purse string suture was then placed and tightened. Wound tension only allowed a partial closure of the circular defect. Dorsal Nasal Flap Text: The defect edges were debeveled with a #15 scalpel blade. Given the location of the defect and the proximity to free margins a dorsal nasal flap was deemed most appropriate. Using a sterile surgical marker, an appropriate dorsal nasal flap was drawn around the defect. The area thus outlined was incised deep to adipose tissue with a #15 scalpel blade. The skin margins were undermined to an appropriate distance in all directions utilizing iris scissors. Graft Cartilage Fenestration Text: The cartilage was fenestrated with a 2mm punch biopsy to help facilitate graft survival and healing. Alternatives Discussed Intro (Do Not Add Period): I discussed alternative treatments to Mohs surgery and specifically discussed the risks and benefits of Post-Care Instructions: I reviewed with the patient in detail post-care instructions. Patient is not to engage in any heavy lifting, exercise, or swimming for the next 14 days. Should the patient develop any fevers, chills, bleeding, severe pain patient will contact the office immediately. Interpolation Flap Text: A decision was made to reconstruct the defect utilizing an interpolation axial flap and a staged reconstruction. A telfa template was made of the defect. This telfa template was then used to outline the interpolation flap. The donor area for the pedicle flap was then injected with anesthesia. The flap was excised through the skin and subcutaneous tissue down to the layer of the underlying musculature. The interpolation flap was carefully excised within this deep plane to maintain its blood supply. The edges of the donor site were undermined. The donor site was closed in a primary fashion. The pedicle was then rotated into position and sutured. Once the tube was sutured into place, adequate blood supply was confirmed with blanching and refill. The pedicle was then wrapped with xeroform gauze and dressed appropriately with a telfa and gauze bandage to ensure continued blood supply and protect the attached pedicle. Scc Poorly Differentiated Histology Text: there are strands and nests of pleomorphic cells present in the infiltrative pattern. Mitotic activity is brisk. There is vascular proliferation and acute and chronic inflammation in the dermis. The findings are those of a poorly differentiated squamous cell carcinoma. Advancement-Rotation Flap Text: The defect edges were debeveled with a #15 scalpel blade. Given the location of the defect, shape of the defect and the proximity to free margins an advancement-rotation flap was deemed most appropriate. Using a sterile surgical marker, an appropriate flap was drawn incorporating the defect and placing the expected incisions within the relaxed skin tension lines where possible. The area thus outlined was incised deep to adipose tissue with a #15 scalpel blade. The skin margins were undermined to an appropriate distance in all directions utilizing iris scissors. Location Indication Override (Is Already Calculated Based On Selected Body Location): Area M Closure 3 Information: This tab is for additional flaps and grafts above and beyond our usual structured repairs.  Please note if you enter information here it will not currently bill and you will need to add the billing information manually. Anesthesia Volume In Cc: 3 Xenograft Text: The defect edges were debeveled with a #15 scalpel blade. Given the location of the defect, shape of the defect and the proximity to free margins a xenograft was deemed most appropriate. The graft was then trimmed to fit the size of the defect. The graft was then placed in the primary defect and oriented appropriately. Banner Transposition Flap Text: The defect edges were debeveled with a #15 scalpel blade. Given the location of the defect and the proximity to free margins a Banner transposition flap was deemed most appropriate. Using a sterile surgical marker, an appropriate flap drawn around the defect. The area thus outlined was incised deep to adipose tissue with a #15 scalpel blade. The skin margins were undermined to an appropriate distance in all directions utilizing iris scissors. Tissue Cultured Epidermal Autograft Text: The defect edges were debeveled with a #15 scalpel blade. Given the location of the defect, shape of the defect and the proximity to free margins a tissue cultured epidermal autograft was deemed most appropriate. The graft was then trimmed to fit the size of the defect. The graft was then placed in the primary defect and oriented appropriately. Ear Star Wedge Flap Text: The defect edges were debeveled with a #15 blade scalpel. Given the location of the defect and the proximity to free margins (helical rim) an ear star wedge flap was deemed most appropriate. Using a sterile surgical marker, the appropriate flap was drawn incorporating the defect and placing the expected incisions between the helical rim and antihelix where possible. The area thus outlined was incised through and through with a #15 scalpel blade. Tumor Debulked?: curette Mauc Instructions: By selecting yes to the question below the [facility] number will be added into the note. This will be calculated automatically based on the diagnosis chosen, the size entered, the body zone selected (H,M,L) and the specific indications you chose. You will also have the option to override the Mohs AUC if you disagree with the automatically calculated number and this option is found in the Case Summary tab. Crescentic Advancement Flap Text: The defect edges were debeveled with a #15 scalpel blade. Given the location of the defect and the proximity to free margins a crescentic advancement flap was deemed most appropriate. Using a sterile surgical marker, the appropriate advancement flap was drawn incorporating the defect and placing the expected incisions within the relaxed skin tension lines where possible. The area thus outlined was incised deep to adipose tissue with a #15 scalpel blade. The skin margins were undermined to an appropriate distance in all directions utilizing iris scissors. H Plasty Text: Given the location of the defect, shape of the defect and the proximity to free margins a H-plasty was deemed most appropriate for repair. Using a sterile surgical marker, the appropriate advancement arms of the H-plasty were drawn incorporating the defect and placing the expected incisions within the relaxed skin tension lines where possible. The area thus outlined was incised deep to adipose tissue with a #15 scalpel blade. The skin margins were undermined to an appropriate distance in all directions utilizing iris scissors. The opposing advancement arms were then advanced into place in opposite direction and anchored with interrupted buried subcutaneous sutures. Detail Level: Detailed Body Location Override (Optional - Billing Will Still Be Based On Selected Body Map Location If Applicable): L cheek Composite Graft Text: The defect edges were debeveled with a #15 scalpel blade. Given the location of the defect, shape of the defect, the proximity to free margins and the fact the defect was full thickness a composite graft was deemed most appropriate. The defect was outline and then transferred to the donor site. A full thickness graft was then excised from the donor site. The graft was then placed in the primary defect, oriented appropriately and then sutured into place. The secondary defect was then repaired using a primary closure. Rhomboid Transposition Flap Text: The defect edges were debeveled with a #15 scalpel blade. Given the location of the defect and the proximity to free margins a rhomboid transposition flap was deemed most appropriate. Using a sterile surgical marker, an appropriate rhomboid flap was drawn incorporating the defect. The area thus outlined was incised deep to adipose tissue with a #15 scalpel blade. The skin margins were undermined to an appropriate distance in all directions utilizing iris scissors. O-Z Plasty Text: The defect edges were debeveled with a #15 scalpel blade. Given the location of the defect, shape of the defect and the proximity to free margins an O-Z plasty (double transposition flap) was deemed most appropriate. Using a sterile surgical marker, the appropriate transposition flaps were drawn incorporating the defect and placing the expected incisions within the relaxed skin tension lines where possible. The area thus outlined was incised deep to adipose tissue with a #15 scalpel blade. The skin margins were undermined to an appropriate distance in all directions utilizing iris scissors. Hemostasis was achieved with electrocautery. The flaps were then transposed into place, one clockwise and the other counterclockwise, and anchored with interrupted buried subcutaneous sutures. Deep Sutures: 5-0 Vicryl Information: Selecting Yes will display possible errors in your note based on the variables you have selected. This validation is only offered as a suggestion for you. PLEASE NOTE THAT THE VALIDATION TEXT WILL BE REMOVED WHEN YOU FINALIZE YOUR NOTE. IF YOU WANT TO FAX A PRELIMINARY NOTE YOU WILL NEED TO TOGGLE THIS TO 'NO' IF YOU DO NOT WANT IT IN YOUR FAXED NOTE. Consent (Ear)/Introductory Paragraph: The rationale for Mohs was explained to the patient and consent was obtained. The risks, benefits and alternatives to therapy were discussed in detail. Specifically, the risks of ear deformity, infection, scarring, bleeding, prolonged wound healing, incomplete removal, allergy to anesthesia, nerve injury and recurrence were addressed. Prior to the procedure, the treatment site was clearly identified and confirmed by the patient. All components of Universal Protocol/PAUSE Rule completed. Full Thickness Lip Wedge Repair (Flap) Text: Given the location of the defect and the proximity to free margins a full thickness wedge repair was deemed most appropriate. Using a sterile surgical marker, the appropriate repair was drawn incorporating the defect and placing the expected incisions perpendicular to the vermilion border. The vermilion border was also meticulously outlined to ensure appropriate reapproximation during the repair. The area thus outlined was incised through and through with a #15 scalpel blade. The muscularis and dermis were reaproximated with deep sutures following hemostasis. Care was taken to realign the vermilion border before proceeding with the superficial closure. Once the vermilion was realigned the superfical and mucosal closure was finished. Mixed Nodular And Infiltrative Bcc Histology Text: Irregular nests of pleomorphic, hyperchromatic basaloid cells with peripheral palisading infiltrate the tissue in a diffuse fashion in association with sclerotic stroma. Transposition Flap Text: The defect edges were debeveled with a #15 scalpel blade. Given the location of the defect and the proximity to free margins a transposition flap was deemed most appropriate. Using a sterile surgical marker, an appropriate transposition flap was drawn incorporating the defect. The area thus outlined was incised deep to adipose tissue with a #15 scalpel blade. The skin margins were undermined to an appropriate distance in all directions utilizing iris scissors. Subsequent Stages Histo Method Verbiage: Using a similar technique to that described above, a thin layer of tissue was removed from all areas where tumor was visible on the previous stage. The tissue was again oriented, mapped, dyed, and processed as above. Consent (Marginal Mandibular)/Introductory Paragraph: The rationale for Mohs was explained to the patient and consent was obtained. The risks, benefits and alternatives to therapy were discussed in detail. Specifically, the risks of damage to the marginal mandibular branch of the facial nerve, infection, scarring, bleeding, prolonged wound healing, incomplete removal, allergy to anesthesia, and recurrence were addressed. Prior to the procedure, the treatment site was clearly identified and confirmed by the patient. All components of Universal Protocol/PAUSE Rule completed. Surgeon Performing Repair (Optional): Landon Singletary M.D. Cheiloplasty (Less Than 50%) Text: A decision was made to reconstruct the defect with a  cheiloplasty. The defect was undermined extensively. Additional obicularis oris muscle was excised with a 15 blade scalpel. The defect was converted into a full thickness wedge, of less than 50% of the vertical height of the lip, to facilite a better cosmetic result. Small vessels were then tied off with 5-0 monocyrl. The obicularis oris, superficial fascia, adipose and dermis were then reapproximated. After the deeper layers were approximated the epidermis was reapproximated with particular care given to realign the vermilion border. Same Histology In Subsequent Stages Text: The pattern and morphology of the tumor is as described in the first stage. Rotation Flap Text: The defect edges were debeveled with a #15 scalpel blade. Given the location of the defect, shape of the defect and the proximity to free margins a rotation flap was deemed most appropriate. Using a sterile surgical marker, an appropriate rotation flap was drawn incorporating the defect and placing the expected incisions within the relaxed skin tension lines where possible. The area thus outlined was incised deep to adipose tissue with a #15 scalpel blade. The skin margins were undermined to an appropriate distance in all directions utilizing iris scissors. Mohs Histo Method Verbiage: Each section was then chromacoded and processed in the Mohs lab using the Mohs protocol and submitted for frozen section. Home Suture Removal Text: Patient was provided instructions on removing sutures and will remove their sutures at home. If they have any questions or difficulties they will call the office. Localized Dermabrasion Text: The patient was draped in routine manner. Localized dermabrasion using 3 x 17 mm wire brush was performed in routine manner to papillary dermis. This spot dermabrasion is being performed to complete skin cancer reconstruction. It also will eliminate the other sun damaged precancerous cells that are known to be part of the regional effect of a lifetime's worth of sun exposure. This localized dermabrasion is therapeutic and should not be considered cosmetic in any regard. Island Pedicle Flap Text: The defect edges were debeveled with a #15 scalpel blade. Given the location of the defect, shape of the defect and the proximity to free margins an island pedicle advancement flap was deemed most appropriate. Using a sterile surgical marker, an appropriate advancement flap was drawn incorporating the defect, outlining the appropriate donor tissue and placing the expected incisions within the relaxed skin tension lines where possible. The area thus outlined was incised deep to adipose tissue with a #15 scalpel blade. The skin margins were undermined to an appropriate distance in all directions around the primary defect and laterally outward around the island pedicle utilizing iris scissors. There was minimal undermining beneath the pedicle flap. Scc Histology Text: There is hyperkeratosis, acanthosis, and cytologic atypia of keratinocytes with hyperchromatic and pleomorphic nuclei throughout the epidermis. No dermal invasion is seen. Chronic inflammation is present in the dermis. Depth Of Tumor Invasion (For Histology): adipose tissue Secondary Intention Text (Leave Blank If You Do Not Want): The defect will heal with secondary intention. Consent 1/Introductory Paragraph: Various treatment options for skin cancer treatment; including but not limited to no treatment, cryosurgery or cryotherapy, excision, radiation therapy, electrodessication and curettage, topical therapeutic agents and light therapy were discussed in depth with the patient. The rationale for Mohs was explained to the patient and consent was obtained. The risks, benefits and alternatives to therapy were discussed in detail. Specifically, the risks of infection, scarring, bleeding, prolonged wound healing, incomplete removal, allergy to anesthesia, nerve injury and recurrence were addressed. Prior to the procedure, the treatment site was clearly identified and confirmed by the patient and the patient agreed that Mohs surgery is the best treatment option for their lesion. No guarantees were made or implied; close follow-up was stressed out to the patient. All components of Universal Protocol/PAUSE Rule completed. Primary Defect Length In Cm (Final Defect Size - Required For Flaps/Grafts): 2.1 Wound Care: Vaseline Melolabial Interpolation Flap Text: A decision was made to reconstruct the defect utilizing an interpolation axial flap and a staged reconstruction. A telfa template was made of the defect. This telfa template was then used to outline the melolabial interpolation flap. The donor area for the pedicle flap was then injected with anesthesia. The flap was excised through the skin and subcutaneous tissue down to the layer of the underlying musculature. The pedicle flap was carefully excised within this deep plane to maintain its blood supply. The edges of the donor site were undermined. The donor site was closed in a primary fashion. The pedicle was then rotated into position and sutured. Once the tube was sutured into place, adequate blood supply was confirmed with blanching and refill. The pedicle was then wrapped with xeroform gauze and dressed appropriately with a telfa and gauze bandage to ensure continued blood supply and protect the attached pedicle. Manual Repair Warning Statement: We plan on removing the manually selected variable below in favor of our much easier automatic structured text blocks found in the previous tab. We decided to do this to help make the flow better and give you the full power of structured data. Manual selection is never going to be ideal in our platform and I would encourage you to avoid using manual selection from this point on, especially since I will be sunsetting this feature. It is important that you do one of two things with the customized text below. First, you can save all of the text in a word file so you can have it for future reference. Second, transfer the text to the appropriate area in the Library tab. Lastly, if there is a flap or graft type which we do not have you need to let us know right away so I can add it in before the variable is hidden. No need to panic, we plan to give you roughly 6 months to make the change. Mercedes Flap Text: The defect edges were debeveled with a #15 scalpel blade. Given the location of the defect, shape of the defect and the proximity to free margins a Mercedes flap was deemed most appropriate. Using a sterile surgical marker, an appropriate advancement flap was drawn incorporating the defect and placing the expected incisions within the relaxed skin tension lines where possible. The area thus outlined was incised deep to adipose tissue with a #15 scalpel blade. The skin margins were undermined to an appropriate distance in all directions utilizing iris scissors. Simple / Intermediate / Complex Repair - Final Wound Length In Cm: 4.2 Purse String (Simple) Text: Given the location of the defect and the characteristics of the surrounding skin a pursestring closure was deemed most appropriate. Undermining was performed circumfirentially around the surgical defect. A purstring suture was then placed and tightened. Bcc  Morpheaform/Sclerosing Histology Text: irregular nests of pleomorphic, hyperchromatic basaloid cells with peripheral palisading infiltrate the tissue in a diffuse fashion in association with a mucoid stroma and dense dermal sclerosis. The tumor infiltrates in thin strands and single cells among the sclerotic collagen fibers in a pattern of morpheaform variant of basal cell carcinoma. Bilobed Flap Text: The defect edges were debeveled with a #15 scalpel blade. Given the location of the defect and the proximity to free margins a bilobe flap was deemed most appropriate. Using a sterile surgical marker, an appropriate bilobe flap drawn around the defect. The area thus outlined was incised deep to adipose tissue with a #15 scalpel blade. The skin margins were undermined to an appropriate distance in all directions utilizing iris scissors. Repair Anesthesia Method: local infiltration A-T Advancement Flap Text: The defect edges were debeveled with a #15 scalpel blade. Given the location of the defect, shape of the defect and the proximity to free margins an A-T advancement flap was deemed most appropriate. Using a sterile surgical marker, an appropriate advancement flap was drawn incorporating the defect and placing the expected incisions within the relaxed skin tension lines where possible. The area thus outlined was incised deep to adipose tissue with a #15 scalpel blade. The skin margins were undermined to an appropriate distance in all directions utilizing iris scissors. W Plasty Text: The lesion was extirpated to the level of the fat with a #15 scalpel blade. Given the location of the defect, shape of the defect and the proximity to free margins a W-plasty was deemed most appropriate for repair. Using a sterile surgical marker, the appropriate transposition arms of the W-plasty were drawn incorporating the defect and placing the expected incisions within the relaxed skin tension lines where possible. The area thus outlined was incised deep to adipose tissue with a #15 scalpel blade. The skin margins were undermined to an appropriate distance in all directions utilizing iris scissors. The opposing transposition arms were then transposed into place in opposite direction and anchored with interrupted buried subcutaneous sutures. Bcc Histology Text: Beneath an irregular epidermis, there are small nodular masses of basaloid neoplastic cells with nuclear pleomorphism attached to the basal layer and surrounded by a loose fibrous stroma and mild inflammation in the dermis. The findings are typical for a basal cell carcinoma. Epidermal Autograft Text: The defect edges were debeveled with a #15 scalpel blade. Given the location of the defect, shape of the defect and the proximity to free margins an epidermal autograft was deemed most appropriate. Using a sterile surgical marker, the primary defect shape was transferred to the donor site. The epidermal graft was then harvested. The skin graft was then placed in the primary defect and oriented appropriately. Bi-Rhombic Flap Text: The defect edges were debeveled with a #15 scalpel blade. Given the location of the defect and the proximity to free margins a bi-rhombic flap was deemed most appropriate. Using a sterile surgical marker, an appropriate rhombic flap was drawn incorporating the defect. The area thus outlined was incised deep to adipose tissue with a #15 scalpel blade. The skin margins were undermined to an appropriate distance in all directions utilizing iris scissors. Scc In Situ Histology Text: There is hyperkeratosis, acanthosis, and cytologic atypia of keratinocytes with hyperchromatic and pleomorphic nuclei throughout the full thickness of the epidermis. No dermal invasion is seen. Chronic inflammation is present in the dermis. Area H Indication Text: Tumors in this location are included in Area H (eyelids, eyebrows, nose, lips, chin, ear, pre-auricular, post-auricular, temple, genitalia, hands, feet, ankles and areola). Tissue conservation is critical in these anatomic locations. Estimated Blood Loss (Cc): minimal Double O-Z Plasty Text: The defect edges were debeveled with a #15 scalpel blade. Given the location of the defect, shape of the defect and the proximity to free margins a Double O-Z plasty (double transposition flap) was deemed most appropriate. Using a sterile surgical marker, the appropriate transposition flaps were drawn incorporating the defect and placing the expected incisions within the relaxed skin tension lines where possible. The area thus outlined was incised deep to adipose tissue with a #15 scalpel blade. The skin margins were undermined to an appropriate distance in all directions utilizing iris scissors. Hemostasis was achieved with electrocautery. The flaps were then transposed into place, one clockwise and the other counterclockwise, and anchored with interrupted buried subcutaneous sutures. Bcc Superficial Histology Text: Beneath an irregular epidermis, there are small nodular masses of basaloid neoplastic cells with nuclear pleomorphism attached to the basal layer and surrounded by a loose fibrous stroma and mild inflammation in the superficial dermis. The findings are typical for a superficial type of basal cell carcinoma. Advancement Flap (Single) Text: The defect edges were debeveled with a #15 scalpel blade. Given the location of the defect and the proximity to free margins a single advancement flap was deemed most appropriate. Using a sterile surgical marker, an appropriate advancement flap was drawn incorporating the defect and placing the expected incisions within the relaxed skin tension lines where possible. The area thus outlined was incised deep to adipose tissue with a #15 scalpel blade. The skin margins were undermined to an appropriate distance in all directions utilizing iris scissors. Closure 2 Information: This tab is for additional flaps and grafts, including complex repair and grafts and complex repair and flaps. You can also specify a different location for the additional defect, if the location is the same you do not need to select a new one. We will insert the automated text for the repair you select below just as we do for solitary flaps and grafts. Please note that at this time if you select a location with a different insurance zone you will need to override the ICD10 and CPT if appropriate. Consent (Nose)/Introductory Paragraph: The rationale for Mohs was explained to the patient and consent was obtained. The risks, benefits and alternatives to therapy were discussed in detail. Specifically, the risks of nasal deformity, changes in the flow of air through the nose, infection, scarring, bleeding, prolonged wound healing, incomplete removal, allergy to anesthesia, nerve injury and recurrence were addressed. Prior to the procedure, the treatment site was clearly identified and confirmed by the patient. All components of Universal Protocol/PAUSE Rule completed. Ftsg Text: The defect edges were debeveled with a #15 scalpel blade. Given the location of the defect, shape of the defect and the proximity to free margins a full thickness skin graft was deemed most appropriate. Using a sterile surgical marker, the primary defect shape was transferred to the donor site. The area thus outlined was incised deep to adipose tissue with a #15 scalpel blade. The harvested graft was then trimmed of adipose tissue until only dermis and epidermis was left. The skin margins of the secondary defect were undermined to an appropriate distance in all directions utilizing iris scissors. The secondary defect was closed with interrupted buried subcutaneous sutures. The skin edges were then re-apposed with running  sutures. The skin graft was then placed in the primary defect and oriented appropriately. Muscle Hinge Flap Text: The defect edges were debeveled with a #15 scalpel blade. Given the size, depth and location of the defect and the proximity to free margins a muscle hinge flap was deemed most appropriate. Using a sterile surgical marker, an appropriate hinge flap was drawn incorporating the defect. The area thus outlined was incised with a #15 scalpel blade. The skin margins were undermined to an appropriate distance in all directions utilizing iris scissors. Afx Histology Text: there is a poorly differentiated spindled cell neoplasm composed of fascicles of atypical spindled and epithelioid cells, infiltrating and replacing papillary and reticular dermis. Mitotic activity is brisk, including atypical forms. The lesion is present on a sun-damaged skin. This pattern supports the diagnosis of atypical fibrous xanthoma. Mohs Rapid Report Verbiage: The area of clinically evident tumor was marked with skin marking ink and appropriately hatched. The initial incision was made following the Mohs approach through the skin. The specimen was taken to the lab, divided into the necessary number of pieces, chromacoded and processed according to the Mohs protocol. This was repeated in successive stages until a tumor free defect was achieved. Donor Site Anesthesia Type: same as repair anesthesia Island Pedicle Flap-Requiring Vessel Identification Text: The defect edges were debeveled with a #15 scalpel blade. Given the location of the defect, shape of the defect and the proximity to free margins an island pedicle advancement flap was deemed most appropriate. Using a sterile surgical marker, an appropriate advancement flap was drawn, based on the axial vessel mentioned above, incorporating the defect, outlining the appropriate donor tissue and placing the expected incisions within the relaxed skin tension lines where possible. The area thus outlined was incised deep to adipose tissue with a #15 scalpel blade. The skin margins were undermined to an appropriate distance in all directions around the primary defect and laterally outward around the island pedicle utilizing iris scissors. There was minimal undermining beneath the pedicle flap. Cheiloplasty (Complex) Text: A decision was made to reconstruct the defect with a  cheiloplasty. The defect was undermined extensively. Additional obicularis oris muscle was excised with a 15 blade scalpel. The defect was converted into a full thickness wedge to facilite a better cosmetic result. Small vessels were then tied off with 5-0 monocyrl. The obicularis oris, superficial fascia, adipose and dermis were then reapproximated. After the deeper layers were approximated the epidermis was reapproximated with particular care given to realign the vermilion border. No Residual Tumor Seen Histology Text: There were no malignant cells seen in the sections examined. Spiral Flap Text: The defect edges were debeveled with a #15 scalpel blade. Given the location of the defect, shape of the defect and the proximity to free margins a spiral flap was deemed most appropriate. Using a sterile surgical marker, an appropriate rotation flap was drawn incorporating the defect and placing the expected incisions within the relaxed skin tension lines where possible. The area thus outlined was incised deep to adipose tissue with a #15 scalpel blade. The skin margins were undermined to an appropriate distance in all directions utilizing iris scissors. Initial Size Of Lesion: 1.3 Tarsorrhaphy Text: A tarsorrhaphy was performed using Frost sutures. Island Pedicle Flap With Canthal Suspension Text: The defect edges were debeveled with a #15 scalpel blade. Given the location of the defect, shape of the defect and the proximity to free margins an island pedicle advancement flap was deemed most appropriate. Using a sterile surgical marker, an appropriate advancement flap was drawn incorporating the defect, outlining the appropriate donor tissue and placing the expected incisions within the relaxed skin tension lines where possible. The area thus outlined was incised deep to adipose tissue with a #15 scalpel blade. The skin margins were undermined to an appropriate distance in all directions around the primary defect and laterally outward around the island pedicle utilizing iris scissors. There was minimal undermining beneath the pedicle flap. A suspension suture was placed in the canthal tendon to prevent tension and prevent ectropion. No Repair - Repaired With Adjacent Surgical Defect Text (Leave Blank If You Do Not Want): After obtaining clear surgical margins the defect was repaired concurrently with another surgical defect which was in close approximation. Consent 2/Introductory Paragraph: Mohs surgery was explained to the patient and consent was obtained. The risks, benefits and alternatives to therapy were discussed in detail. Specifically, the risks of infection, scarring, bleeding, prolonged wound healing, incomplete removal, allergy to anesthesia, nerve injury and recurrence were addressed. Prior to the procedure, the treatment site was clearly identified and confirmed by the patient. All components of Universal Protocol/PAUSE Rule completed. Mastoid Interpolation Flap Text: A decision was made to reconstruct the defect utilizing an interpolation axial flap and a staged reconstruction. A telfa template was made of the defect. This telfa template was then used to outline the mastoid interpolation flap. The donor area for the pedicle flap was then injected with anesthesia. The flap was excised through the skin and subcutaneous tissue down to the layer of the underlying musculature. The pedicle flap was carefully excised within this deep plane to maintain its blood supply. The edges of the donor site were undermined. The donor site was closed in a primary fashion. The pedicle was then rotated into position and sutured. Once the tube was sutured into place, adequate blood supply was confirmed with blanching and refill. The pedicle was then wrapped with xeroform gauze and dressed appropriately with a telfa and gauze bandage to ensure continued blood supply and protect the attached pedicle. Modified Advancement Flap Text: The defect edges were debeveled with a #15 scalpel blade. Given the location of the defect, shape of the defect and the proximity to free margins a modified advancement flap was deemed most appropriate. Using a sterile surgical marker, an appropriate advancement flap was drawn incorporating the defect and placing the expected incisions within the relaxed skin tension lines where possible. The area thus outlined was incised deep to adipose tissue with a #15 scalpel blade. The skin margins were undermined to an appropriate distance in all directions utilizing iris scissors. Eye Protection Verbiage: Before proceeding with the stage, a plastic scleral shield was inserted. The globe was anesthetized with a few drops of 1% lidocaine with 1:100,000 epinephrine. Then, an appropriate sized scleral shield was chosen and coated with lacrilube ointment. The shield was gently inserted and left in place for the duration of each stage. After the stage was completed, the shield was gently removed. Postop Diagnosis: same Referring Physician (Optional): Aaron Burns M.D. Primary Defect Width In Cm (Final Defect Size - Required For Flaps/Grafts): 1.4 Purse String (Intermediate) Text: Given the location of the defect and the characteristics of the surrounding skin a pursestring intermediate closure was deemed most appropriate. Undermining was performed circumfirentially around the surgical defect. A purstring suture was then placed and tightened. Bilobed Transposition Flap Text: The defect edges were debeveled with a #15 scalpel blade. Given the location of the defect and the proximity to free margins a bilobed transposition flap was deemed most appropriate. Using a sterile surgical marker, an appropriate bilobe flap drawn around the defect. The area thus outlined was incised deep to adipose tissue with a #15 scalpel blade. The skin margins were undermined to an appropriate distance in all directions utilizing iris scissors. Cheek Interpolation Flap Text: A decision was made to reconstruct the defect utilizing an interpolation axial flap and a staged reconstruction. A telfa template was made of the defect. This telfa template was then used to outline the Cheek Interpolation flap. The donor area for the pedicle flap was then injected with anesthesia. The flap was excised through the skin and subcutaneous tissue down to the layer of the underlying musculature. The interpolation flap was carefully excised within this deep plane to maintain its blood supply. The edges of the donor site were undermined. The donor site was closed in a primary fashion. The pedicle was then rotated into position and sutured. Once the tube was sutured into place, adequate blood supply was confirmed with blanching and refill. The pedicle was then wrapped with xeroform gauze and dressed appropriately with a telfa and gauze bandage to ensure continued blood supply and protect the attached pedicle. O-L Flap Text: The defect edges were debeveled with a #15 scalpel blade. Given the location of the defect, shape of the defect and the proximity to free margins an O-L flap was deemed most appropriate. Using a sterile surgical marker, an appropriate advancement flap was drawn incorporating the defect and placing the expected incisions within the relaxed skin tension lines where possible. The area thus outlined was incised deep to adipose tissue with a #15 scalpel blade. The skin margins were undermined to an appropriate distance in all directions utilizing iris scissors. Z Plasty Text: The lesion was extirpated to the level of the fat with a #15 scalpel blade. Given the location of the defect, shape of the defect and the proximity to free margins a Z-plasty was deemed most appropriate for repair. Using a sterile surgical marker, the appropriate transposition arms of the Z-plasty were drawn incorporating the defect and placing the expected incisions within the relaxed skin tension lines where possible. The area thus outlined was incised deep to adipose tissue with a #15 scalpel blade. The skin margins were undermined to an appropriate distance in all directions utilizing iris scissors. The opposing transposition arms were then transposed into place in opposite direction and anchored with interrupted buried subcutaneous sutures. O-T Advancement Flap Text: The defect edges were debeveled with a #15 scalpel blade. Given the location of the defect, shape of the defect and the proximity to free margins an O-T advancement flap was deemed most appropriate. Using a sterile surgical marker, an appropriate advancement flap was drawn incorporating the defect and placing the expected incisions within the relaxed skin tension lines where possible. The area thus outlined was incised deep to adipose tissue with a #15 scalpel blade. The skin margins were undermined to an appropriate distance in all directions utilizing iris scissors. Epidermal Sutures: 6-0 Fast Absorbing Gut Scc Ka Subtype Histology Text: there is a cup-shaped exoendophytic epithelial neoplasm characterized by proliferations of keratinocytes with abundant eosinophilic glossy cytoplasm and nuclei with open chromatin in the papillary and upper reticular dermis. Multiple inclusions containing elastic material are seen within the cytoplasm. The features are of squamous cell carcinoma, keratoacanthoma type. Mohs Case Number:  Wound Care (No Sutures): Petrolatum Bcc Infiltrative Histology Text: There were numerous aggregates of basaloid cells demonstrating an infiltrative pattern. Dermal Autograft Text: The defect edges were debeveled with a #15 scalpel blade. Given the location of the defect, shape of the defect and the proximity to free margins a dermal autograft was deemed most appropriate. Using a sterile surgical marker, the primary defect shape was transferred to the donor site. The area thus outlined was incised deep to adipose tissue with a #15 scalpel blade. The harvested graft was then trimmed of adipose and epidermal tissue until only dermis was left. The skin graft was then placed in the primary defect and oriented appropriately. Helical Rim Advancement Flap Text: The defect edges were debeveled with a #15 blade scalpel. Given the location of the defect and the proximity to free margins (helical rim) a double helical rim advancement flap was deemed most appropriate. Using a sterile surgical marker, the appropriate advancement flaps were drawn incorporating the defect and placing the expected incisions between the helical rim and antihelix where possible. The area thus outlined was incised through and through with a #15 scalpel blade. With a skin hook and iris scissors, the flaps were gently and sharply undermined and freed up. Bcc  Nodular Histology Text: there are are irregular nodular masses and strands of basaloid pleomorphic, hyperchromatic neoplastic cells in the tissue, some areas of which show abundant dense sclerotic stroma. The tumor appears to be a nodular basal cell carcinoma, but with areas of sclerosis within it. Area M Indication Text: Tumors in this location are included in Area M (cheek, forehead, scalp, neck, jawline and pretibial skin). Mohs surgery is indicated for tumors in these anatomic locations. S Plasty Text: Given the location and shape of the defect, and the orientation of relaxed skin tension lines, an S-plasty was deemed most appropriate for repair. Using a sterile surgical marker, the appropriate outline of the S-plasty was drawn, incorporating the defect and placing the expected incisions within the relaxed skin tension lines where possible. The area thus outlined was incised deep to adipose tissue with a #15 scalpel blade. The skin margins were undermined to an appropriate distance in all directions utilizing iris scissors. The skin flaps were advanced over the defect. The opposing margins were then approximated with interrupted buried subcutaneous sutures. Advancement Flap (Double) Text: The defect edges were debeveled with a #15 scalpel blade. Given the location of the defect and the proximity to free margins a double advancement flap was deemed most appropriate. Using a sterile surgical marker, the appropriate advancement flaps were drawn incorporating the defect and placing the expected incisions within the relaxed skin tension lines where possible. The area thus outlined was incised deep to adipose tissue with a #15 scalpel blade. The skin margins were undermined to an appropriate distance in all directions utilizing iris scissors. Consent (Lip)/Introductory Paragraph: The rationale for Mohs was explained to the patient and consent was obtained. The risks, benefits and alternatives to therapy were discussed in detail. Specifically, the risks of lip deformity, changes in the oral aperture, infection, scarring, bleeding, prolonged wound healing, incomplete removal, allergy to anesthesia, nerve injury and recurrence were addressed. Prior to the procedure, the treatment site was clearly identified and confirmed by the patient. All components of Universal Protocol/PAUSE Rule completed. Split-Thickness Skin Graft Text: The defect edges were debeveled with a #15 scalpel blade. Given the location of the defect, shape of the defect and the proximity to free margins a split thickness skin graft was deemed most appropriate. Using a sterile surgical marker, the primary defect shape was transferred to the donor site. The split thickness graft was then harvested. The skin graft was then placed in the primary defect and oriented appropriately. Melolabial Transposition Flap Text: The defect edges were debeveled with a #15 scalpel blade. Given the location of the defect and the proximity to free margins a melolabial flap was deemed most appropriate. Using a sterile surgical marker, an appropriate melolabial transposition flap was drawn incorporating the defect. The area thus outlined was incised deep to adipose tissue with a #15 scalpel blade. The skin margins were undermined to an appropriate distance in all directions utilizing iris scissors. Keystone Flap Text: The defect edges were debeveled with a #15 scalpel blade. Given the location of the defect, shape of the defect a keystone flap was deemed most appropriate. Using a sterile surgical marker, an appropriate keystone flap was drawn incorporating the defect, outlining the appropriate donor tissue and placing the expected incisions within the relaxed skin tension lines where possible. The area thus outlined was incised deep to adipose tissue with a #15 scalpel blade. The skin margins were undermined to an appropriate distance in all directions around the primary defect and laterally outward around the flap utilizing iris scissors. Consent (Spinal Accessory)/Introductory Paragraph: The rationale for Mohs was explained to the patient and consent was obtained. The risks, benefits and alternatives to therapy were discussed in detail. Specifically, the risks of damage to the spinal accessory nerve, infection, scarring, bleeding, prolonged wound healing, incomplete removal, allergy to anesthesia, and recurrence were addressed. Prior to the procedure, the treatment site was clearly identified and confirmed by the patient. All components of Universal Protocol/PAUSE Rule completed.

## 2019-10-22 NOTE — OB RN DELIVERY SUMMARY - NS_LABORCHARACTER_OBGYN_ALL_OB
Antibiotics in labor/External electronic FM Antibiotics in labor/External electronic FM/Steroids in labor

## 2019-10-22 NOTE — OB RN DELIVERY SUMMARY - NS_SEPSISRSKCALC_OBGYN_ALL_OB_FT
No temperature has been documented for this patient in CPN or on the OB Flowsheet. Ensure the highest temperature during labor was documented on the OB Flowsheet.  No gestational age at birth has been documented. Ensure delivery date/time has been entered above.  Rupture of membranes must be entered above.

## 2019-10-22 NOTE — OB RN INTRAOPERATIVE NOTE - NS_ADDITIONALPERSONNEL_OBGYN_ALL_OB_FT
Resident - MD Sofia PGY-3; Baby nurse - OMAR Manzo RN; Resident - MD Sofia PGY-3; Baby nurse - OMAR Manzo RN; roya - Kendall FARRIS and Kristin FARRIS

## 2019-10-22 NOTE — OB NEONATOLOGY/PEDIATRICIAN DELIVERY SUMMARY - NSPEDSNEONOTESA_OBGYN_ALL_OB_FT
Baby girl born at 28.3 wks via CS for NRFHT (late decels) and breech position to a 35 yo  mother with A+ blood type. Maternal hx of GDMA1, prenatal hx of IUGR and fetal alert for low set ears and pericardial effusion. PNL neg/NR/imm, GBS neg on 10/4. PPROM was on  at 1600 with clear fluids. Mother had been admitted to [facility] and cerclage place. Received Mag and Betamethasone on 10/1 and 10/2 with additional rescue doses of both on 10/18. Baby born at 12:39, carried immediately to  Stabilization Unit, placed in warming bag, stimulated and suctioned. HR initially below 60, pulse ox reading in 60%'s. PPV initiated at 1 minute of life and changed to CPAP 5/40% by 5 minutes of life with vitals improved to sats 84% and . Sats in mid-90's by 7 minutes of life, HR maintained around 120. Max CPAP settings 5/100% briefly then to 5/60%. Nasal cannula placed at 13 minutes of life and baby transported to the NICU in an isolette. APGARs 6/9.     Admitted to NICU on n.c. 5/30% which was later increased to 6/30%. UV and UA placed. Initiated on starter TPN, labs drawn as per plan.

## 2019-10-22 NOTE — BRIEF OPERATIVE NOTE - NSICDXBRIEFPREOP_GEN_ALL_CORE_FT
PRE-OP DIAGNOSIS:  Breech presentation 22-Oct-2019 07:21:27  Leonardo Sofia  Active  labor 22-Oct-2019 07:20:41  Leonardo Sofia  Abnormal fetal heart rate affecting pregnancy 22-Oct-2019 07:20:18  Leonardo Sofia   premature rupture of membranes (PPROM) with unknown onset of labor 22-Oct-2019 07:20:05  Leonardo Sofia

## 2019-10-22 NOTE — OB PROVIDER DELIVERY SUMMARY - NSPROVIDERDELIVERYNOTE_OBGYN_ALL_OB_FT
Female infant delivered from breech presentation without difficulty.  Cord clamped and cut,  taken to awaiting pediatric team.  See operative report  for details.      Cerclage x2 was removed at end of procedure.     EBL 1000ml      Arabella Aparicio MD

## 2019-10-23 LAB
BASOPHILS # BLD AUTO: 0.04 K/UL — SIGNIFICANT CHANGE UP (ref 0–0.2)
BASOPHILS NFR BLD AUTO: 0.3 % — SIGNIFICANT CHANGE UP (ref 0–2)
EOSINOPHIL # BLD AUTO: 0.1 K/UL — SIGNIFICANT CHANGE UP (ref 0–0.5)
EOSINOPHIL NFR BLD AUTO: 0.7 % — SIGNIFICANT CHANGE UP (ref 0–6)
HCT VFR BLD CALC: 22.9 % — LOW (ref 34.5–45)
HGB BLD-MCNC: 7.2 G/DL — LOW (ref 11.5–15.5)
IMM GRANULOCYTES NFR BLD AUTO: 2.9 % — HIGH (ref 0–1.5)
LYMPHOCYTES # BLD AUTO: 15.2 % — SIGNIFICANT CHANGE UP (ref 13–44)
LYMPHOCYTES # BLD AUTO: 2.2 K/UL — SIGNIFICANT CHANGE UP (ref 1–3.3)
MCHC RBC-ENTMCNC: 24.9 PG — LOW (ref 27–34)
MCHC RBC-ENTMCNC: 31.4 % — LOW (ref 32–36)
MCV RBC AUTO: 79.2 FL — LOW (ref 80–100)
MONOCYTES # BLD AUTO: 1.03 K/UL — HIGH (ref 0–0.9)
MONOCYTES NFR BLD AUTO: 7.1 % — SIGNIFICANT CHANGE UP (ref 2–14)
NEUTROPHILS # BLD AUTO: 10.72 K/UL — HIGH (ref 1.8–7.4)
NEUTROPHILS NFR BLD AUTO: 73.8 % — SIGNIFICANT CHANGE UP (ref 43–77)
NRBC # FLD: 0 K/UL — SIGNIFICANT CHANGE UP (ref 0–0)
PLATELET # BLD AUTO: 219 K/UL — SIGNIFICANT CHANGE UP (ref 150–400)
PMV BLD: 11.8 FL — SIGNIFICANT CHANGE UP (ref 7–13)
RBC # BLD: 2.89 M/UL — LOW (ref 3.8–5.2)
RBC # FLD: 15.9 % — HIGH (ref 10.3–14.5)
WBC # BLD: 14.51 K/UL — HIGH (ref 3.8–10.5)
WBC # FLD AUTO: 14.51 K/UL — HIGH (ref 3.8–10.5)

## 2019-10-23 RX ORDER — FERROUS SULFATE 325(65) MG
325 TABLET ORAL DAILY
Refills: 0 | Status: DISCONTINUED | OUTPATIENT
Start: 2019-10-23 | End: 2019-10-25

## 2019-10-23 RX ORDER — MEDROXYPROGESTERONE ACETATE 150 MG/ML
150 INJECTION, SUSPENSION, EXTENDED RELEASE INTRAMUSCULAR ONCE
Refills: 0 | Status: DISCONTINUED | OUTPATIENT
Start: 2019-10-23 | End: 2019-10-25

## 2019-10-23 RX ORDER — IBUPROFEN 200 MG
600 TABLET ORAL EVERY 6 HOURS
Refills: 0 | Status: DISCONTINUED | OUTPATIENT
Start: 2019-10-23 | End: 2019-10-25

## 2019-10-23 RX ORDER — POLYETHYLENE GLYCOL 3350 17 G/17G
17 POWDER, FOR SOLUTION ORAL DAILY
Refills: 0 | Status: DISCONTINUED | OUTPATIENT
Start: 2019-10-23 | End: 2019-10-25

## 2019-10-23 RX ORDER — OXYCODONE HYDROCHLORIDE 5 MG/1
5 TABLET ORAL
Refills: 0 | Status: DISCONTINUED | OUTPATIENT
Start: 2019-10-23 | End: 2019-10-25

## 2019-10-23 RX ADMIN — Medication 600 MILLIGRAM(S): at 14:39

## 2019-10-23 RX ADMIN — Medication 975 MILLIGRAM(S): at 21:33

## 2019-10-23 RX ADMIN — Medication 975 MILLIGRAM(S): at 14:39

## 2019-10-23 RX ADMIN — Medication 975 MILLIGRAM(S): at 14:30

## 2019-10-23 RX ADMIN — Medication 600 MILLIGRAM(S): at 22:30

## 2019-10-23 RX ADMIN — HEPARIN SODIUM 5000 UNIT(S): 5000 INJECTION INTRAVENOUS; SUBCUTANEOUS at 06:12

## 2019-10-23 RX ADMIN — Medication 975 MILLIGRAM(S): at 22:30

## 2019-10-23 RX ADMIN — Medication 325 MILLIGRAM(S): at 17:38

## 2019-10-23 RX ADMIN — POLYETHYLENE GLYCOL 3350 17 GRAM(S): 17 POWDER, FOR SOLUTION ORAL at 17:39

## 2019-10-23 RX ADMIN — Medication 600 MILLIGRAM(S): at 21:33

## 2019-10-23 NOTE — PROGRESS NOTE ADULT - ASSESSMENT
A/P: 35yo POD#1 s/p LTCS, [id number].  Patient is stable and doing well post-operatively. Pt would like [facility] for BC

## 2019-10-23 NOTE — CHART NOTE - NSCHARTNOTESELECT_GEN_ALL_CORE
Chart Note/Event Note
Event Note
Event Note/Chart note
Event Note/NICU Consult Note
Follow-Up/Nutrition Services
Nutrition Services/Follow-Up
complains of pain/discomfort

## 2019-10-23 NOTE — CHART NOTE - NSCHARTNOTEFT_GEN_A_CORE
Source: Patient [x] Medical record reviewed. Patient seen for length of stay nutrition follow-up. Per chart review patient s/p  delivery 10/22. Patient reports tolerating meals at this time and endorses normal appetite/PO intake. No nausea/vomiting. No BM since procedure as of yet. No nutrition related concerns voiced at this time.    Current Diet : Diet, Regular (10-22-19 @ 02:05)  PO intake:  < 50% [ ] 50-75% [ ]   % [x]  other :  Current Weight: 81.6kg (admit)    __________________ Pertinent Medications__________________   MEDICATIONS  (STANDING):  acetaminophen   Tablet .. 975 milliGRAM(s) Oral <User Schedule>  diphtheria/tetanus/pertussis (acellular) Vaccine (ADAcel) 0.5 milliLiter(s) IntraMuscular once  ferrous    sulfate 325 milliGRAM(s) Oral daily  heparin  Injectable 5000 Unit(s) SubCutaneous every 12 hours  ibuprofen  Tablet. 600 milliGRAM(s) Oral every 6 hours  influenza   Vaccine 0.5 milliLiter(s) IntraMuscular once  medroxyPROGESTERone depot Injectable 150 milliGRAM(s) IntraMuscular once  oxytocin Infusion 333.333 milliUNIT(s)/Min (1000 mL/Hr) IV Continuous <Continuous>  polyethylene glycol 3350 17 Gram(s) Oral daily    MEDICATIONS  (PRN):  diphenhydrAMINE 25 milliGRAM(s) Oral every 6 hours PRN Itching  docusate sodium 100 milliGRAM(s) Oral two times a day PRN Stool softening  glycerin Suppository - Adult 1 Suppository(s) Rectal at bedtime PRN Constipation  lanolin Ointment 1 Application(s) Topical every 6 hours PRN Sore Nipples  magnesium hydroxide Suspension 30 milliLiter(s) Oral two times a day PRN Constipation  oxyCODONE    IR 5 milliGRAM(s) Oral once PRN Moderate to Severe Pain (4-10)  oxyCODONE    IR 5 milliGRAM(s) Oral every 3 hours PRN Moderate to Severe Pain (4-10)  simethicone 80 milliGRAM(s) Chew every 4 hours PRN Gas    __________________ Pertinent Labs__________________   reviewed    Skin: lower transverse abdomen surgical incision  Edema: none noted    Estimated Needs:   [x] no change since previous assessment  [ ] recalculated:     Previous Nutrition Diagnosis:   [x] Increased Nutrient Needs, Altered nutrition-related labs  Nutrition Diagnosis is [ ] ongoing  [ ] resolved [x] not applicable     Goal(s):  1. Patient to meet > 75% estimated pro/kcal needs.  Interventions:   1. Continue Regular diet.   2. Please Encourage po intake, assist with meals and menu selections, provide alternatives PRN.     Monitoring and Evaluation:   1. Monitor weights, labs, BM's, skin integrity, p.o. intake.  Follow-up per department protocol.   RD to remain available, Jany Albarran RDN, CDN - Pager #[phone]

## 2019-10-23 NOTE — PROGRESS NOTE ADULT - SUBJECTIVE AND OBJECTIVE BOX
OB Progress Note:  Delivery, POD#1    S: 35yo POD#1 s/p LTCS . Her pain is well controlled. She is tolerating a regular diet and passing flatus. Denies N/V. Denies CP/SOB/lightheadedness/dizziness. Pt denies sxs of PEC: denies headache, visual changes, RUQ pain, respiratory distress  She is ambulating without difficulty.   Voiding spontaneously.   Pt would like Depot for BC.    O:   Vital Signs Last 24 Hrs  T(C): 37.2 (23 Oct 2019 05:13), Max: 37.2 (22 Oct 2019 18:28)  T(F): 99 (23 Oct 2019 05:13), Max: 99 (23 Oct 2019 05:13)  HR: 86 (23 Oct 2019 05:13) (82 - 97)  BP: 107/62 (23 Oct 2019 05:13) (93/52 - 107/62)  BP(mean): --  RR: 17 (23 Oct 2019 05:13) (16 - 17)  SpO2: 99% (23 Oct 2019 05:13) (97% - 99%)    Labs:  Blood type: A Positive  Rubella IgG: Positive (10-01 @ 16:14)  RPR: Negative                          9.3<L>   12.99<H> >-----------< 243    ( 10-21 @ 22:40 )             29.4<L>                  PE:  General: NAD  Abdomen: Mildly distended, appropriately tender, incision c/d/i.  Extremities: No erythema, no pitting edema

## 2019-10-23 NOTE — PROGRESS NOTE ADULT - ATTENDING COMMENTS
Associate Chief of L&D (LATE ENTRY)     This patient was unfamiliar to me until I met her for the first time today.  Upon review of the chart, the patient was admitted 9/28/19 with PPROM @ 25 weeks gestation with cerclage in situ who was admitted and then on 10/22 had CAT II, and malpresentation and had an urgent c section.    S: Patient is doing well without complaints    O: Vital Signs Last 24 Hrs  T(C): 36.6 (23 Oct 2019 09:46), Max: 37.2 (22 Oct 2019 18:28)  T(F): 97.8 (23 Oct 2019 09:46), Max: 99 (23 Oct 2019 05:13)  HR: 86 (23 Oct 2019 09:46) (82 - 97)  BP: 113/53 (23 Oct 2019 09:46) (94/45 - 113/53)  BP(mean): --  RR: 16 (23 Oct 2019 09:46) (16 - 17)  SpO2: 98% (23 Oct 2019 09:46) (98% - 99%)    Labs:                        7.2    14.51 )-----------( 219      ( 23 Oct 2019 06:06 )             22.9       Physical Exam:  General: NAD  Abdomen: soft, non-tender, non-distended, +BS Fundus firm  INC:C/D/I  Vaginal: Lochia scant  Extremities: trace edema    acetaminophen   Tablet .. 975 milliGRAM(s) Oral <User Schedule>  diphenhydrAMINE 25 milliGRAM(s) Oral every 6 hours PRN  diphtheria/tetanus/pertussis (acellular) Vaccine (ADAcel) 0.5 milliLiter(s) IntraMuscular once  docusate sodium 100 milliGRAM(s) Oral two times a day PRN  glycerin Suppository - Adult 1 Suppository(s) Rectal at bedtime PRN  heparin  Injectable 5000 Unit(s) SubCutaneous every 12 hours  ibuprofen  Tablet. 600 milliGRAM(s) Oral every 6 hours  influenza   Vaccine 0.5 milliLiter(s) IntraMuscular once  lanolin Ointment 1 Application(s) Topical every 6 hours PRN  magnesium hydroxide Suspension 30 milliLiter(s) Oral two times a day PRN  medroxyPROGESTERone depot Injectable 150 milliGRAM(s) IntraMuscular once  oxyCODONE    IR 5 milliGRAM(s) Oral once PRN  oxyCODONE    IR 5 milliGRAM(s) Oral every 3 hours PRN  oxytocin Infusion 333.333 milliUNIT(s)/Min IV Continuous <Continuous>  simethicone 80 milliGRAM(s) Chew every 4 hours PRN      A/P: POD#1 s/p C section for PPROM at 25 weeks gestation and progressed to 28 weeks gestation and had CAT II and malpresentation and required delivery via c section    -Continue routine PP care  -Continue the current pain medication  -Encourage regular diet  -DVT ppx: SCDs only when not ambulating      Silvana Fletcher MD

## 2019-10-24 ENCOUNTER — TRANSCRIPTION ENCOUNTER (OUTPATIENT)
Age: 34
End: 2019-10-24

## 2019-10-24 RX ADMIN — Medication 600 MILLIGRAM(S): at 06:53

## 2019-10-24 RX ADMIN — POLYETHYLENE GLYCOL 3350 17 GRAM(S): 17 POWDER, FOR SOLUTION ORAL at 14:20

## 2019-10-24 RX ADMIN — Medication 975 MILLIGRAM(S): at 21:28

## 2019-10-24 RX ADMIN — Medication 600 MILLIGRAM(S): at 15:00

## 2019-10-24 RX ADMIN — Medication 975 MILLIGRAM(S): at 22:32

## 2019-10-24 RX ADMIN — Medication 975 MILLIGRAM(S): at 06:10

## 2019-10-24 RX ADMIN — Medication 975 MILLIGRAM(S): at 06:53

## 2019-10-24 RX ADMIN — Medication 600 MILLIGRAM(S): at 06:09

## 2019-10-24 RX ADMIN — Medication 600 MILLIGRAM(S): at 14:20

## 2019-10-24 RX ADMIN — Medication 975 MILLIGRAM(S): at 14:22

## 2019-10-24 RX ADMIN — Medication 325 MILLIGRAM(S): at 14:20

## 2019-10-24 RX ADMIN — Medication 975 MILLIGRAM(S): at 14:30

## 2019-10-24 RX ADMIN — Medication 600 MILLIGRAM(S): at 21:29

## 2019-10-24 RX ADMIN — Medication 600 MILLIGRAM(S): at 22:32

## 2019-10-24 NOTE — DISCHARGE NOTE OB - CARE PROVIDER_API CALL
Marilyn Arevalo)  Obstetrics and Gynecology  [address], [address]  [place] [address]  Phone: [phone]  Fax: [phone]  Follow Up Time: 2 weeks

## 2019-10-24 NOTE — DISCHARGE NOTE OB - HOSPITAL COURSE
Patient had uncomplicated low transverse  section secondary to NRFHT at 28weeks gestation. EBL 1L.  Please see operative note for details.  During postpartum course patient's vitals were stable, vaginal bleeding appropriate, and pain well controlled.  Post operation day one hematocrit was appropriate.  On day of discharge patient was ambulating, her pain controlled with oral medications, had adequate oral intake, and was voiding freely.  Discharge instructions and precautions were given.  Will return to clinic in 2 weeks for incision check.  Postpartum birth control plan is Depot.

## 2019-10-24 NOTE — DISCHARGE NOTE OB - PATIENT PORTAL LINK FT
You can access the FollowMyHealth Patient Portal offered by [facility] by registering at the following website: [email or web address]. By joining [facility]’s FollowMyHealth portal, you will also be able to view your health information using other applications (apps) compatible with our system.

## 2019-10-24 NOTE — DISCHARGE NOTE OB - PLAN OF CARE
Recover After discharge, please stay on pelvic rest for 6 weeks, meaning no sexual intercourse, no tampons and no douching.  No driving for 2 weeks as women can loose a lot of blood during delivery and there is a possibility of being lightheaded/fainting.  No lifting objects heavier than baby for two weeks.  Expect to have vaginal bleeding/spotting for up to six weeks.  The bleeding should get lighter and more white/light brown with time.  For bleeding soaking more than a pad an hour or passing clots greater than the size of your fist, come in to the emergency department.    Follow up in clinic in 2 weeks for incision check.  Call clinic for noticeable increase in redness or swelling at incision, discharge from incision, or opening of skin at incision site.

## 2019-10-24 NOTE — PROGRESS NOTE ADULT - ATTENDING COMMENTS
Associate Chief of L&D    S: Patient is doing well without complaints. Tolerates regular diet.  Ambulating without difficulty. Denies N/V. Voiding freely.      O: Vital Signs Last 24 Hrs  T(C): 37.1 (24 Oct 2019 06:08), Max: 37.1 (24 Oct 2019 06:08)  T(F): 98.7 (24 Oct 2019 06:08), Max: 98.7 (24 Oct 2019 06:08)  HR: 79 (24 Oct 2019 06:08) (79 - 104)  BP: 99/50 (24 Oct 2019 06:08) (99/50 - 114/57)  BP(mean): --  RR: 16 (24 Oct 2019 06:08) (16 - 18)  SpO2: 99% (24 Oct 2019 06:08) (98% - 100%)    Labs:                        7.2    14.51 )-----------( 219      ( 23 Oct 2019 06:06 )             22.9       Physical Exam:  General: NAD  Abdomen: soft, non-tender, non-distended, +BS Fundus firm   INC: C/D/I  Vaginal: Lochia wnl  Extremities: trace    acetaminophen   Tablet .. 975 milliGRAM(s) Oral <User Schedule>  diphenhydrAMINE 25 milliGRAM(s) Oral every 6 hours PRN  diphtheria/tetanus/pertussis (acellular) Vaccine (ADAcel) 0.5 milliLiter(s) IntraMuscular once  docusate sodium 100 milliGRAM(s) Oral two times a day PRN  ferrous    sulfate 325 milliGRAM(s) Oral daily  glycerin Suppository - Adult 1 Suppository(s) Rectal at bedtime PRN  heparin  Injectable 5000 Unit(s) SubCutaneous every 12 hours  ibuprofen  Tablet. 600 milliGRAM(s) Oral every 6 hours  influenza   Vaccine 0.5 milliLiter(s) IntraMuscular once  lanolin Ointment 1 Application(s) Topical every 6 hours PRN  magnesium hydroxide Suspension 30 milliLiter(s) Oral two times a day PRN  medroxyPROGESTERone depot Injectable 150 milliGRAM(s) IntraMuscular once  oxyCODONE    IR 5 milliGRAM(s) Oral once PRN  oxyCODONE    IR 5 milliGRAM(s) Oral every 3 hours PRN  oxytocin Infusion 333.333 milliUNIT(s)/Min IV Continuous <Continuous>  polyethylene glycol 3350 17 Gram(s) Oral daily  simethicone 80 milliGRAM(s) Chew every 4 hours PRN      A/P    -Continue routine PP care  -Continue the current pain medication  -Encourage regular diet  -DVT ppx: SCDs only when not ambulating      Silvana Fletcher MD Associate Chief of L&D    S: Patient is doing well without complaints. Tolerates regular diet.  Ambulating without difficulty. Denies N/V. Voiding freely.      O: Vital Signs Last 24 Hrs  T(C): 37.1 (24 Oct 2019 06:08), Max: 37.1 (24 Oct 2019 06:08)  T(F): 98.7 (24 Oct 2019 06:08), Max: 98.7 (24 Oct 2019 06:08)  HR: 79 (24 Oct 2019 06:08) (79 - 104)  BP: 99/50 (24 Oct 2019 06:08) (99/50 - 114/57)  BP(mean): --  RR: 16 (24 Oct 2019 06:08) (16 - 18)  SpO2: 99% (24 Oct 2019 06:08) (98% - 100%)    Labs:                        7.2    14.51 )-----------( 219      ( 23 Oct 2019 06:06 )             22.9       Physical Exam:  General: NAD  Abdomen: soft, non-tender, non-distended, +BS Fundus firm   INC: C/D/I  Vaginal: Lochia wnl  Extremities: trace    acetaminophen   Tablet .. 975 milliGRAM(s) Oral <User Schedule>  diphenhydrAMINE 25 milliGRAM(s) Oral every 6 hours PRN  diphtheria/tetanus/pertussis (acellular) Vaccine (ADAcel) 0.5 milliLiter(s) IntraMuscular once  docusate sodium 100 milliGRAM(s) Oral two times a day PRN  ferrous    sulfate 325 milliGRAM(s) Oral daily  glycerin Suppository - Adult 1 Suppository(s) Rectal at bedtime PRN  heparin  Injectable 5000 Unit(s) SubCutaneous every 12 hours  ibuprofen  Tablet. 600 milliGRAM(s) Oral every 6 hours  influenza   Vaccine 0.5 milliLiter(s) IntraMuscular once  lanolin Ointment 1 Application(s) Topical every 6 hours PRN  magnesium hydroxide Suspension 30 milliLiter(s) Oral two times a day PRN  medroxyPROGESTERone depot Injectable 150 milliGRAM(s) IntraMuscular once  oxyCODONE    IR 5 milliGRAM(s) Oral once PRN  oxyCODONE    IR 5 milliGRAM(s) Oral every 3 hours PRN  oxytocin Infusion 333.333 milliUNIT(s)/Min IV Continuous <Continuous>  polyethylene glycol 3350 17 Gram(s) Oral daily  simethicone 80 milliGRAM(s) Chew every 4 hours PRN      A/P POD#2 s/p C section for PPROM at 25 weeks gestation and progressed to 28 weeks gestation and had CAT II and malpresentation and required delivery via c section      -Continue routine PP care  -Continue the current pain medication  -Encourage regular diet  -DVT ppx: SCDs only when not ambulating      Silvana Fletcher MD

## 2019-10-24 NOTE — DISCHARGE NOTE OB - CARE PLAN
Principal Discharge DX:	 delivery delivered  Goal:	Recover  Assessment and plan of treatment:	After discharge, please stay on pelvic rest for 6 weeks, meaning no sexual intercourse, no tampons and no douching.  No driving for 2 weeks as women can loose a lot of blood during delivery and there is a possibility of being lightheaded/fainting.  No lifting objects heavier than baby for two weeks.  Expect to have vaginal bleeding/spotting for up to six weeks.  The bleeding should get lighter and more white/light brown with time.  For bleeding soaking more than a pad an hour or passing clots greater than the size of your fist, come in to the emergency department.    Follow up in clinic in 2 weeks for incision check.  Call clinic for noticeable increase in redness or swelling at incision, discharge from incision, or opening of skin at incision site.

## 2019-10-24 NOTE — PROGRESS NOTE ADULT - ASSESSMENT
A/P: 35yo POD#2 s/p LTCS.  Patient is stable and doing well post-operatively. A/P: 35yo POD#2 s/p LTCS. EBL 1L  Patient is stable and doing well post-operatively.

## 2019-10-24 NOTE — DISCHARGE NOTE OB - MEDICATION SUMMARY - MEDICATIONS TO STOP TAKING
I will STOP taking the medications listed below when I get home from the hospital:    progesterone 200 mg oral capsule  -- 1 cap(s) by mouth once a day    progesterone topical  -- vaginal once a day (at bedtime)

## 2019-10-24 NOTE — PROGRESS NOTE ADULT - SUBJECTIVE AND OBJECTIVE BOX
OB Progress Note: LTCS, POD#2    S: 33yo POD#2 s/p LTCS. Pain is well controlled. She is tolerating a regular diet and passing flatus. She is voiding spontaneously, and ambulating without difficulty. Denies CP/SOB. Denies lightheadedness/dizziness. Denies N/V. Denies sxs od PEC: denies headache, visual changes, RUQ pain, respiratory distress    O:  Vitals:  Vital Signs Last 24 Hrs  T(C): 37.1 (24 Oct 2019 06:08), Max: 37.1 (24 Oct 2019 06:08)  T(F): 98.7 (24 Oct 2019 06:08), Max: 98.7 (24 Oct 2019 06:08)  HR: 79 (24 Oct 2019 06:08) (79 - 104)  BP: 99/50 (24 Oct 2019 06:08) (99/50 - 114/57)  BP(mean): --  RR: 16 (24 Oct 2019 06:08) (16 - 18)  SpO2: 99% (24 Oct 2019 06:08) (98% - 100%)    MEDICATIONS  (STANDING):  acetaminophen   Tablet .. 975 milliGRAM(s) Oral <User Schedule>  diphtheria/tetanus/pertussis (acellular) Vaccine (ADAcel) 0.5 milliLiter(s) IntraMuscular once  ferrous    sulfate 325 milliGRAM(s) Oral daily  heparin  Injectable 5000 Unit(s) SubCutaneous every 12 hours  ibuprofen  Tablet. 600 milliGRAM(s) Oral every 6 hours  influenza   Vaccine 0.5 milliLiter(s) IntraMuscular once  medroxyPROGESTERone depot Injectable 150 milliGRAM(s) IntraMuscular once  oxytocin Infusion 333.333 milliUNIT(s)/Min (1000 mL/Hr) IV Continuous <Continuous>  polyethylene glycol 3350 17 Gram(s) Oral daily      MEDICATIONS  (PRN):  diphenhydrAMINE 25 milliGRAM(s) Oral every 6 hours PRN Itching  docusate sodium 100 milliGRAM(s) Oral two times a day PRN Stool softening  glycerin Suppository - Adult 1 Suppository(s) Rectal at bedtime PRN Constipation  lanolin Ointment 1 Application(s) Topical every 6 hours PRN Sore Nipples  magnesium hydroxide Suspension 30 milliLiter(s) Oral two times a day PRN Constipation  oxyCODONE    IR 5 milliGRAM(s) Oral once PRN Moderate to Severe Pain (4-10)  oxyCODONE    IR 5 milliGRAM(s) Oral every 3 hours PRN Moderate to Severe Pain (4-10)  simethicone 80 milliGRAM(s) Chew every 4 hours PRN Gas      Labs:  Blood type: A Positive  Rubella IgG: Positive (10-01 @ 16:14)  RPR: Negative                          7.2<L>   14.51<H> >-----------< 219    ( 10-23 @ 06:06 )             22.9<L>                        9.3<L>   12.99<H> >-----------< 243    ( 10-21 @ 22:40 )             29.4<L>        PE:  General: NAD  Abdomen: Soft, appropriately tender, incision c/d/i.  Extremities: No erythema, no pitting edema

## 2019-10-25 VITALS
DIASTOLIC BLOOD PRESSURE: 65 MMHG | RESPIRATION RATE: 17 BRPM | SYSTOLIC BLOOD PRESSURE: 108 MMHG | OXYGEN SATURATION: 100 % | HEART RATE: 72 BPM | TEMPERATURE: 98 F

## 2019-10-25 DIAGNOSIS — O26.872 CERVICAL SHORTENING, SECOND TRIMESTER: ICD-10-CM

## 2019-10-25 DIAGNOSIS — Z3A.26 26 WEEKS GESTATION OF PREGNANCY: ICD-10-CM

## 2019-10-25 DIAGNOSIS — O36.5920 MATERNAL CARE FOR OTHER KNOWN OR SUSPECTED POOR FETAL GROWTH, SECOND TRIMESTER, NOT APPLICABLE OR UNSPECIFIED: ICD-10-CM

## 2019-10-25 DIAGNOSIS — O41.92X0 DISORDER OF AMNIOTIC FLUID AND MEMBRANES, UNSPECIFIED, SECOND TRIMESTER, NOT APPLICABLE OR UNSPECIFIED: ICD-10-CM

## 2019-10-25 DIAGNOSIS — O42.912 PRETERM PREMATURE RUPTURE OF MEMBRANES, UNSPECIFIED AS TO LENGTH OF TIME BETWEEN RUPTURE AND ONSET OF LABOR, SECOND TRIMESTER: ICD-10-CM

## 2019-10-25 RX ORDER — ACETAMINOPHEN 500 MG
3 TABLET ORAL
Qty: 0 | Refills: 0 | DISCHARGE
Start: 2019-10-25

## 2019-10-25 RX ORDER — IBUPROFEN 200 MG
1 TABLET ORAL
Qty: 0 | Refills: 0 | DISCHARGE
Start: 2019-10-25

## 2019-10-25 RX ORDER — PROGESTERONE 200 MG/1
1 CAPSULE, LIQUID FILLED ORAL
Qty: 0 | Refills: 0 | DISCHARGE

## 2019-10-25 RX ORDER — BENZOYL PEROXIDE MICRONIZED 5.8 %
1 TOWELETTE (EA) TOPICAL
Qty: 0 | Refills: 0 | DISCHARGE

## 2019-10-25 RX ORDER — PROGESTERONE 65MCG/24HR
0 INTRAUTERINE DEVICE INTRAUTERINE
Qty: 0 | Refills: 0 | DISCHARGE

## 2019-10-25 RX ORDER — MEDROXYPROGESTERONE ACETATE 150 MG/ML
150 INJECTION, SUSPENSION, EXTENDED RELEASE INTRAMUSCULAR ONCE
Refills: 0 | Status: COMPLETED | OUTPATIENT
Start: 2019-10-25 | End: 2019-10-25

## 2019-10-25 RX ADMIN — Medication 975 MILLIGRAM(S): at 13:10

## 2019-10-25 RX ADMIN — Medication 975 MILLIGRAM(S): at 12:36

## 2019-10-25 RX ADMIN — MEDROXYPROGESTERONE ACETATE 150 MILLIGRAM(S): 150 INJECTION, SUSPENSION, EXTENDED RELEASE INTRAMUSCULAR at 13:22

## 2019-10-25 RX ADMIN — Medication 975 MILLIGRAM(S): at 05:54

## 2019-10-25 RX ADMIN — Medication 600 MILLIGRAM(S): at 12:36

## 2019-10-25 RX ADMIN — Medication 600 MILLIGRAM(S): at 05:54

## 2019-10-25 RX ADMIN — Medication 325 MILLIGRAM(S): at 12:36

## 2019-10-25 RX ADMIN — POLYETHYLENE GLYCOL 3350 17 GRAM(S): 17 POWDER, FOR SOLUTION ORAL at 12:36

## 2019-10-25 RX ADMIN — Medication 600 MILLIGRAM(S): at 13:10

## 2019-10-25 RX ADMIN — Medication 600 MILLIGRAM(S): at 06:36

## 2019-10-25 RX ADMIN — Medication 975 MILLIGRAM(S): at 06:35

## 2019-10-25 NOTE — PROGRESS NOTE ADULT - PROBLEM SELECTOR PLAN 1
#PPROM  - s/p azithro, amp and amox for latency  - mild leukocytosis on cbc 10/19 (12.62) and NST with recurrent decels on 10/19  - NST overnight reactive w/o decels  - f/u AM CBC  - plan for IOL @ 34w
#PPROM  - s/p azithro, amp and amox for latency  - mild leukocytosis on cbc 10/19 (12.62) and NST with recurrent decels on 10/19  - NST overnight reactive w/o decels  - f/u AM CBC  - plan for IOL @ 34w.
- Continue motrin, tylenol, oxycodone PRN for pain control.  - Increase ambulation  - Continue regular diet  - Discharge planning    Edison Lewis PGY1
- Continue regular diet.  - Increase ambulation.  - Continue motrin, tylenol, oxycodone PRN for pain control.    Edison Lewis PGY1
- Continue regular diet.  - Increase ambulation.  - Continue motrin, tylenol, oxycodone PRN for pain control.  - F/u AM CBC    Edison Lewis PGY1
-admit to antepartum service  -NICU consulted, will see patient shortly   -BMZ for FLM, Amp for GBS unknown, latency abx, Mg for neuroprotection  -breech, patient aware of possible need for classical , consent obtained   -ATU sono f/u official report  -will schedule fetal echo   seen and examined with Dr. Roya Flores-Kannan PGY4
1. Maternal well being   - CV: hemodynamically stable  - Resp: saturating well on RA   - GI: reg diet  - : pad counts  - Heme: HSQ for DVT ppx   - ID: afebrile, no signs of infection     2. Fetal well being  -  s/p BMZ for fetal lung maturity (9/30-10/1)  - s/p latency antibiotics   - s/p Mg for neuroprotection  - prenatal vitamin  - NST BID  - BPP Mondays and thursdays    ODALIS Anand pgy3
1. Maternal well being   - CV: hemodynamically stable  - Resp: saturating well on RA   - GI: reg diet  - Heme: HSQ for DVT ppx   - ID: afebrile, no signs of infection     2. Fetal well being  - BMZ for fetal lung maturity   - latency antibiotics   - s/p Mg for neuroprotection  - prenatal vitamin  - status post fetal echo 10/1 for trace pericardial effusion seen on ATU scan    Leonardo Sofia MD PGY3
1. Maternal well being   - CV: hemodynamically stable  - Resp: saturating well on RA   - GI: reg diet  - Heme: consider HSQ for DVT ppx   - ID: afebrile, no signs of infection     2. Fetal well being  - BMZ for fetal lung maturity   - latency antibiotics   - s/p Mg for neuroprotection  - prenatal vitamin    C Kika pgy3
1. Maternal well being  - CV: hemodynamically stable  - Resp: saturating well  - GI: reg diet  - Heme: HSQ for DVT ppx  - ID: afebrile, no signs of infection    2. Fetal well being  - NST BID   - BPP Mondays and Thursdays  - Prenatal vitamin  - s/p latency antibiotics  - s/p BMZ for fetal lung maturity  - s/p Mg for neuroprotection, will restart Mg at time of delivery    Leonardo Sofia MD PGY3
1. Maternal well being  - CV: hemodynamically stable  - Resp: saturating well  - GI: reg diet  - Heme: HSQ for DVT ppx  - ID: afebrile, no signs of infection    2. Fetal well being  - NST BID   - BPP Mondays and Thursdays  - Prenatal vitamin  - s/p latency antibiotics  - s/p BMZ for fetal lung maturity  - s/p Mg for neuroprotection, will restart Mg at time of delivery    Leonardo Sofia MD PGY3
1. Maternal well being  - CV: hemodynamically stable  - Resp: saturating well  - GI: reg diet  - Heme: HSQ for DVT ppx  - ID: afebrile, no signs of infection    2. Fetal well being  - NST BID   - BPP Mondays and thursdays  - Prenatal vitamin  - continue latency antibiotics  - s/p BMZ for fetal lung maturity  - s/p Mg for neuroprotection, will restart Mg at time of delivery    Leonardo Sofia MD PGY3
1. Maternal well being  - CV: hemodynamically stable  - Resp: saturating well  - GI: reg diet  - Heme: HSQ for DVT ppx  - ID: afebrile, no signs of infection    2. Fetal well being  - NST BID   - BPP Mondays and thursdays  - Prenatal vitamin  - continue latency antibiotics  - s/p BMZ for fetal lung maturity  - s/p Mg for neuroprotection, will restart Mg at time of delivery    Leonardo Sofia MD PGY3
1. Maternal well being  - CV: hemodynamically stable  - Resp: saturating well  - GI: reg diet  - Heme: HSQ for DVT ppx  - ID: afebrile, no signs of infection    2. Fetal well being  - NST BID   - BPP Mondays and thursdays  - Prenatal vitamin  - continue latency antibiotics  - s/p BMZ for fetal lung maturity  - s/p Mg for neuroprotection, will restart Mg at time of delivery    ODALIS Anand pgy3
1. Maternal well being  - CV: hemodynamically stable  - Resp: saturating well  - GI: reg diet  - Heme: HSQ for DVT ppx  - ID: afebrile, no signs of infection    2. Fetal well being  - NST BID   - BPP Mondays and thursdays  - Prenatal vitamin  - continue latency antibiotics  - s/p BMZ for fetal lung maturity  - s/p Mg for neuroprotection, will restart Mg at time of delivery    TLal PGY3
1. Maternal well being  - CV: hemodynamically stable  - Resp: saturating well  - GI: reg diet  - Heme: HSQ for DVT ppx  - ID: afebrile, no signs of infection    2. Fetal well being  - NST BID   - BPP Mondays and thursdays  - Prenatal vitamin  - s/p latency antibiotics  - s/p BMZ for fetal lung maturity  - s/p Mg for neuroprotection, will restart Mg at time of delivery    Leonardo Sofia MD PGY3
Currently with cerclage in situ  Compeleted course of latency antibiotics  GBS negative  s/p BMZ for FLM  HSQ and venodynes   s/o Mg for neuroprotection at this time  s/p NICU consult  NST BID 2hr; BPP twice weekly  Continue to monitor for any signs of PTL, IAI, placental abruption
Currently with cerclage in situ  Compeleted course of latency antibiotics  GBS negative  s/p BMZ for FLM  s/o Mg for neuroprotection at this time  s/p NICU consult  NST BID 2hr; BPP twice weekly    Continue to monitor for any signs of PTL, IAI, placental abruption
Currently with cerclage in situ  s/p course of latency antibiotics  GBS negative  s/p BMZ for FLM  HSQ and venodynes   s/o Mg for neuroprotection at this time  s/p NICU consult  NST BID 2hr; BPP twice weekly  Continue to monitor for any signs of PTL, IAI, placental abruption.
Currently with cerclage in situ  s/p course of latency antibiotics  GBS negative  s/p BMZ for FLM 9/30-10/1  HSQ and venodynes   s/p Mg for neuroprotection   s/p NICU consult  NST BID 2hr; BPP twice weekly  Fetus with known pericardial effusion  Magnesium sulfate to be administered prior to delivery for neuroprotection   Continue to monitor for any signs of PTL, IAI, placental abruption.
Neuro: no pain  CV: Hemodynamically stable  Pulm: Saturating well on room air  GI: regular diet  : voiding spontanesouly  Heme: c/w HSQ and SCDs for DVT ppx  Fetal well-being: status post BMZ, and latency abx, NST BID, BPP twice weekly, PNV, IoL@34 weeks  Dispo: Continue routine inpatient care    Leonardo Sofia MD PGY3
Plan: Neuro: no pain  CV: Hemodynamically stable  Pulm: Saturating well on room air  GI: regular diet  : voiding spontaneously  Heme: c/w HSQ and SCDs for DVT ppx  Fetal well-being: transfer to L&D for continuous EFM, status post BMZ, and latency abx, with give rescue course of BMZ, will restart magnesium  Dispo: Continue routine inpatient care    Discussed with Dr. Jillian Sofia MD PGY3
1. Maternal well being   - CV: hemodynamically stable  - Resp: saturating well on RA   - GI: reg diet  - Heme: HSQ for DVT ppx   - ID: afebrile, no signs of infection     2. Fetal well being  - BMZ for fetal lung maturity completed 9/30-10/1  - latency antibiotics   - s/p Mg for neuroprotection  - prenatal vitamin  - status post fetal echo 10/1 for trace pericardial effusion seen on ATU scan  - ATU scan today    Ashley Chi MD PGY3
Maternal well being   - CV: hemodynamically stable  - Resp: saturating well on RA   - GI: reg diet  - Heme: HSQ for DVT ppx   - ID: afebrile, no signs of infection     2. Fetal well being  - BMZ for fetal lung maturity completed 9/30-10/1  - latency antibiotics   - s/p Mg for neuroprotection  - prenatal vitamin  - status post fetal echo 10/1 for trace pericardial effusion seen on ATU scan    Ashley Chi MD PGY3

## 2019-10-25 NOTE — PROGRESS NOTE ADULT - SUBJECTIVE AND OBJECTIVE BOX
OB Postpartum Note:  Delivery, POD#3    S: 35yo POD#3 s/p LTCS. The patient feels well.  Pain is well controlled. She is tolerating a regular diet and passing flatus. She is voiding spontaneously, and ambulating without difficulty. Denies CP/SOB. Denies lightheadedness/dizziness. Denies N/V. Denies sxs of PEC: no headaches, visual changes, RUQ pain, and respiratory distress    O:  Vitals:  Vital Signs Last 24 Hrs  T(C): 36.8 (25 Oct 2019 05:44), Max: 36.9 (24 Oct 2019 11:07)  T(F): 98.2 (25 Oct 2019 05:44), Max: 98.4 (24 Oct 2019 11:07)  HR: 72 (25 Oct 2019 05:44) (72 - 97)  BP: 108/65 (25 Oct 2019 05:44) (105/56 - 108/65)  BP(mean): --  RR: 17 (25 Oct 2019 05:44) (17 - 17)  SpO2: 100% (25 Oct 2019 05:44) (98% - 100%)    MEDICATIONS  (STANDING):  acetaminophen   Tablet .. 975 milliGRAM(s) Oral <User Schedule>  diphtheria/tetanus/pertussis (acellular) Vaccine (ADAcel) 0.5 milliLiter(s) IntraMuscular once  ferrous    sulfate 325 milliGRAM(s) Oral daily  heparin  Injectable 5000 Unit(s) SubCutaneous every 12 hours  ibuprofen  Tablet. 600 milliGRAM(s) Oral every 6 hours  influenza   Vaccine 0.5 milliLiter(s) IntraMuscular once  medroxyPROGESTERone depot Injectable 150 milliGRAM(s) IntraMuscular once  oxytocin Infusion 333.333 milliUNIT(s)/Min (1000 mL/Hr) IV Continuous <Continuous>  polyethylene glycol 3350 17 Gram(s) Oral daily    MEDICATIONS  (PRN):  diphenhydrAMINE 25 milliGRAM(s) Oral every 6 hours PRN Itching  docusate sodium 100 milliGRAM(s) Oral two times a day PRN Stool softening  glycerin Suppository - Adult 1 Suppository(s) Rectal at bedtime PRN Constipation  lanolin Ointment 1 Application(s) Topical every 6 hours PRN Sore Nipples  magnesium hydroxide Suspension 30 milliLiter(s) Oral two times a day PRN Constipation  oxyCODONE    IR 5 milliGRAM(s) Oral once PRN Moderate to Severe Pain (4-10)  oxyCODONE    IR 5 milliGRAM(s) Oral every 3 hours PRN Moderate to Severe Pain (4-10)  simethicone 80 milliGRAM(s) Chew every 4 hours PRN Gas      LABS:  Blood type: A Positive  Rubella IgG: Positive (10-01 @ 16:14)  RPR: Negative                          7.2<L>   14.51<H> >-----------< 219    ( 10-23 @ 06:06 )             22.9<L>        Physical exam:  Gen: NAD  Abdomen: Soft, nontender, no distension , firm uterine fundus at umbilicus.  Incision: Clean, dry, and intact   Pelvic: Normal lochia noted  Ext: No calf tenderness

## 2019-10-25 NOTE — PROGRESS NOTE ADULT - PROBLEM SELECTOR PROBLEM 1
delivery delivered
Normal vaginal delivery
Premature rupture of membranes (PROM), labor delayed by therapy, antepartum

## 2019-11-08 DIAGNOSIS — O36.5920 MATERNAL CARE FOR OTHER KNOWN OR SUSPECTED POOR FETAL GROWTH, SECOND TRIMESTER, NOT APPLICABLE OR UNSPECIFIED: ICD-10-CM

## 2019-11-08 DIAGNOSIS — O26.872 CERVICAL SHORTENING, SECOND TRIMESTER: ICD-10-CM

## 2019-11-08 DIAGNOSIS — O41.92X0 DISORDER OF AMNIOTIC FLUID AND MEMBRANES, UNSPECIFIED, SECOND TRIMESTER, NOT APPLICABLE OR UNSPECIFIED: ICD-10-CM

## 2019-11-08 DIAGNOSIS — O42.912 PRETERM PREMATURE RUPTURE OF MEMBRANES, UNSPECIFIED AS TO LENGTH OF TIME BETWEEN RUPTURE AND ONSET OF LABOR, SECOND TRIMESTER: ICD-10-CM

## 2019-11-08 DIAGNOSIS — Z3A.27 27 WEEKS GESTATION OF PREGNANCY: ICD-10-CM

## 2019-11-20 ENCOUNTER — OUTPATIENT (OUTPATIENT)
Dept: OUTPATIENT SERVICES | Facility: HOSPITAL | Age: 34
LOS: 1 days | Discharge: LEFT BEFORE DISCHARGE | End: 2019-11-20

## 2019-11-20 DIAGNOSIS — Z98.890 OTHER SPECIFIED POSTPROCEDURAL STATES: Chronic | ICD-10-CM

## 2019-11-20 DIAGNOSIS — Z90.49 ACQUIRED ABSENCE OF OTHER SPECIFIED PARTS OF DIGESTIVE TRACT: Chronic | ICD-10-CM

## 2019-12-12 DIAGNOSIS — F43.23 ADJUSTMENT DISORDER WITH MIXED ANXIETY AND DEPRESSED MOOD: ICD-10-CM

## 2019-12-23 DIAGNOSIS — O41.92X0 DISORDER OF AMNIOTIC FLUID AND MEMBRANES, UNSPECIFIED, SECOND TRIMESTER, NOT APPLICABLE OR UNSPECIFIED: ICD-10-CM

## 2019-12-23 DIAGNOSIS — O36.5920 MATERNAL CARE FOR OTHER KNOWN OR SUSPECTED POOR FETAL GROWTH, SECOND TRIMESTER, NOT APPLICABLE OR UNSPECIFIED: ICD-10-CM

## 2019-12-23 DIAGNOSIS — O42.912 PRETERM PREMATURE RUPTURE OF MEMBRANES, UNSPECIFIED AS TO LENGTH OF TIME BETWEEN RUPTURE AND ONSET OF LABOR, SECOND TRIMESTER: ICD-10-CM

## 2019-12-23 DIAGNOSIS — Z3A.27 27 WEEKS GESTATION OF PREGNANCY: ICD-10-CM

## 2019-12-31 NOTE — OB RN PATIENT PROFILE - SURGICAL SITE INCISION
Render Post-Care Instructions In Note?: no Was A Bandage Applied: Yes Dressing: bandage Electrodesiccation And Curettage Text: The wound bed was treated with electrodesiccation and curettage after the biopsy was performed. Type Of Destruction Used: Curettage Consent: Written consent was obtained and risks were reviewed including but not limited to scarring, infection, bleeding, scabbing, incomplete removal, nerve damage and allergy to anesthesia. Anesthesia Volume In Cc (Will Not Render If 0): 1 Biopsy Type: H and E Electrodesiccation Text: The wound bed was treated with electrodesiccation after the biopsy was performed. Notification Instructions: Patient will be notified of biopsy results. However, patient instructed to call the office if not contacted within 2 weeks. X Size Of Lesion In Cm: 0 Depth Of Biopsy: dermis Lab Facility: [id number] Wound Care: Petrolatum Post-Care Instructions: I reviewed with the patient in detail post-care instructions. Patient is to keep the biopsy site dry overnight, and then apply bacitracin twice daily until healed. Patient may apply hydrogen peroxide soaks to remove any crusting. Cryotherapy Text: The wound bed was treated with cryotherapy after the biopsy was performed. Anesthesia Type: 1% lidocaine with epinephrine no Billing Type: Third-Party Bill Lab: [id number] Curettage Text: The wound bed was treated with curettage after the biopsy was performed. Detail Level: Detailed Biopsy Method: Dermablade Silver Nitrate Text: The wound bed was treated with silver nitrate after the biopsy was performed. Hemostasis: Drysol

## 2020-01-08 DIAGNOSIS — Z3A.28 28 WEEKS GESTATION OF PREGNANCY: ICD-10-CM

## 2020-01-08 DIAGNOSIS — O42.912 PRETERM PREMATURE RUPTURE OF MEMBRANES, UNSPECIFIED AS TO LENGTH OF TIME BETWEEN RUPTURE AND ONSET OF LABOR, SECOND TRIMESTER: ICD-10-CM

## 2020-01-08 DIAGNOSIS — O36.5920 MATERNAL CARE FOR OTHER KNOWN OR SUSPECTED POOR FETAL GROWTH, SECOND TRIMESTER, NOT APPLICABLE OR UNSPECIFIED: ICD-10-CM

## 2020-01-08 DIAGNOSIS — O41.92X0 DISORDER OF AMNIOTIC FLUID AND MEMBRANES, UNSPECIFIED, SECOND TRIMESTER, NOT APPLICABLE OR UNSPECIFIED: ICD-10-CM

## 2020-01-31 NOTE — OB RN INTRAOPERATIVE NOTE - NSANESTHESIATYPE1_OBGYN_ALL_OB
PRE-OP DIAGNOSIS:  Ductal carcinoma in situ (DCIS) of right breast 31-Jan-2020 13:36:50  Miguel Angel Snowden Jr.  Papilloma of right breast 31-Jan-2020 13:36:35  Miguel Angel Snowden Jr. Spinal

## 2020-04-03 NOTE — ED PROVIDER NOTE - NSCAREINITIATED _GEN_ER
Anxious    Assessment:  1.  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 EP study in 2013 demonstrated poor antegrade accessory pathway conduction and inducible but self terminating episodes of AVRT. The patient describes episodes of rapid palpitations which occurred 2-3 times per day. These seem to last a few minutes. The 5-day event monitor from 2/10/2020 through 2/15/2020 did not demonstrate any episodes of tachycardia. Based on these findings, it would appear that the palpitations he describes do not correlate with an arrhythmia such as AVRT. Nonetheless, the monitoring period was relatively brief, only 5 days. And he does clearly have the presence of an accessory pathway which may foster AV reentry tachycardia on occasion. 2.  Anxiety    Plan:  1. Keep log with symptoms and record on cardiac monitor to see correlation. Wear a 30 day monitor. 2.  Follow up in 6 weeks  3. No current plan for EP study or catheter ablation. This note was scribed in the presence of Tonia Frost MD by Andris Holstein, RN.     I, Dr. Tonia Frost, personally performed the services described in this documentation as scribed by Andris Holstein, RN  in my presence, and it is both accurate and complete. QUALITY MEASURES  1. Tobacco Cessation Counseling: Yes  2. Retake of BP if >140/90:   NA  3. Documentation to PCP/referring for new patient:  Sent to PCP at close of office visit  4. CAD patient on anti-platelet: NA  5. CAD patient on STATIN therapy:  NA  6.  Patient with CHF and aFib on anticoagulation:  Anibal Villegas M.D. Fadumo Douglas(PA)

## 2020-04-07 ENCOUNTER — TRANSCRIPTION ENCOUNTER (OUTPATIENT)
Age: 35
End: 2020-04-07

## 2020-04-23 NOTE — ED ADULT TRIAGE NOTE - NS ED NURSE DIRECT TO ROOM YN
Detail Level: Detailed Additional Notes: Patient given quote of $350 for 70 units of Dysport. Additional Notes: Recommended orthopedic hand surgeon Benji Colmenares MD. No

## 2020-06-25 NOTE — OB NEONATOLOGY/PEDIATRICIAN DELIVERY SUMMARY - BABY A: APGAR 1 MIN HEART RATE, DELIVERY
[facility] phone call message - order or referral request from patient:     Order or referral being requested: Requested order: re-cert skilled nursing    Additional Details: 1x/week for 9 weeks and 2 PRN    OK to leave a message on voice mail? Yes    Primary language: Rwandan      needed? Yes    Call taken on June 25, 2020 at 1:33 PM by Nidhi Medel    Order request route to P [facility] TRIAGE   Referrals Route to [facility] ([facility]/[place]/[facility]) CARE COORDINATOR       (1) less than 100 beats/min

## 2020-12-15 PROBLEM — Z86.69 HISTORY OF ACUTE OTITIS MEDIA: Status: RESOLVED | Noted: 2017-11-02 | Resolved: 2020-12-15

## 2020-12-15 PROBLEM — Z87.09 HISTORY OF SORE THROAT: Status: RESOLVED | Noted: 2017-11-02 | Resolved: 2020-12-15

## 2020-12-15 PROBLEM — N76.0 BACTERIAL VAGINOSIS: Status: RESOLVED | Noted: 2017-10-30 | Resolved: 2020-12-15

## 2020-12-16 PROBLEM — J06.9 VIRAL URI WITH COUGH: Status: RESOLVED | Noted: 2018-01-18 | Resolved: 2020-12-16

## 2021-06-03 ENCOUNTER — RESULT REVIEW (OUTPATIENT)
Age: 36
End: 2021-06-03

## 2021-06-09 ENCOUNTER — TRANSCRIPTION ENCOUNTER (OUTPATIENT)
Age: 36
End: 2021-06-09

## 2021-07-07 ENCOUNTER — TRANSCRIPTION ENCOUNTER (OUTPATIENT)
Age: 36
End: 2021-07-07

## 2021-07-20 ENCOUNTER — APPOINTMENT (OUTPATIENT)
Dept: FAMILY MEDICINE | Facility: CLINIC | Age: 36
End: 2021-07-20
Payer: COMMERCIAL

## 2021-07-20 VITALS — TEMPERATURE: 97.3 F | SYSTOLIC BLOOD PRESSURE: 90 MMHG | DIASTOLIC BLOOD PRESSURE: 62 MMHG

## 2021-07-20 DIAGNOSIS — Z00.00 ENCOUNTER FOR GENERAL ADULT MEDICAL EXAMINATION W/OUT ABNORMAL FINDINGS: ICD-10-CM

## 2021-07-20 PROCEDURE — 99214 OFFICE O/P EST MOD 30 MIN: CPT

## 2021-07-20 PROCEDURE — 99072 ADDL SUPL MATRL&STAF TM PHE: CPT

## 2021-07-25 PROBLEM — Z00.00 HEALTHCARE MAINTENANCE: Status: ACTIVE | Noted: 2021-07-20

## 2021-07-25 NOTE — REVIEW OF SYSTEMS
[Muscle Pain] : muscle pain [Back Pain] : back pain [Negative] : Psychiatric [Joint Pain] : no joint pain [Joint Stiffness] : no joint stiffness [Joint Swelling] : no joint swelling [Muscle Weakness] : no muscle weakness [Skin Rash] : no skin rash

## 2021-07-25 NOTE — HISTORY OF PRESENT ILLNESS
[FreeTextEntry8] : 35 yo female presents to the office for low back pain x two weeks. the patient reports that since her  1.5 years ago, she's experienced LBP 2-3 days prior to her menstrual cycle starting, which would resolve 2-3 days into her cycle. recently, the patient reports that her LBP has been persistent x approximately two weeks. she denies any trauma/heavy lifting, and denies any radiating pain down her legs, or any numbness/tingling to lower extremities. she reports that OTC NSAIDS usually help alleviate the pain completely, but now they're not working as well.

## 2021-07-27 ENCOUNTER — NON-APPOINTMENT (OUTPATIENT)
Age: 36
End: 2021-07-27

## 2021-07-27 ENCOUNTER — APPOINTMENT (OUTPATIENT)
Dept: ORTHOPEDIC SURGERY | Facility: CLINIC | Age: 36
End: 2021-07-27
Payer: COMMERCIAL

## 2021-07-27 VITALS
BODY MASS INDEX: 30.16 KG/M2 | DIASTOLIC BLOOD PRESSURE: 84 MMHG | HEART RATE: 56 BPM | SYSTOLIC BLOOD PRESSURE: 128 MMHG | HEIGHT: 65 IN | OXYGEN SATURATION: 97 % | WEIGHT: 181 LBS

## 2021-07-27 PROCEDURE — 99072 ADDL SUPL MATRL&STAF TM PHE: CPT

## 2021-07-27 PROCEDURE — 99203 OFFICE O/P NEW LOW 30 MIN: CPT

## 2021-07-27 PROCEDURE — 72110 X-RAY EXAM L-2 SPINE 4/>VWS: CPT

## 2021-07-27 NOTE — PHYSICAL EXAM
[de-identified] : Lumbar Physical Exam\par \par Gait - Normal\par \par Station - Normal\par \par Sagittal balance - Normal\par \par Compensatory mechanism? - None\par \par Heel walk - Normal\par \par Toe walk - Normal\par \par Reflexes\par Patellar - normal\par Gastroc - normal\par Clonus - No\par \par Hip Exam - Normal\par \par Straight leg raise - none\par \par Pulses - 2+ dp/pt\par \par Range of motion - normal\par \par Sensation \par Sensation intact to light touch in L1, L2, L3, L4, L5 and S1 dermatomes bilaterally\par \par Motor\par 	IP	Quad	HS	TA	Gastroc	EHL\par Right	5/5	5/5	5/5	5/5	5/5	5/5\par Left	5/5	5/5	3+/5	5/5	5/5	5/5 [de-identified] : Lumbar radiographs\par L5-S1 spondylolisthesis noted\par Pars defect noted at L5\par There is motion of flexion-extension

## 2021-07-30 LAB
25(OH)D3 SERPL-MCNC: 19.5 NG/ML
ALBUMIN SERPL ELPH-MCNC: 4.6 G/DL
ALP BLD-CCNC: 56 U/L
ALT SERPL-CCNC: 40 U/L
ANION GAP SERPL CALC-SCNC: 13 MMOL/L
APPEARANCE: CLEAR
AST SERPL-CCNC: 33 U/L
BACTERIA: NEGATIVE
BASOPHILS # BLD AUTO: 0.02 K/UL
BASOPHILS NFR BLD AUTO: 0.3 %
BILIRUB SERPL-MCNC: 0.2 MG/DL
BILIRUBIN URINE: NEGATIVE
BLOOD URINE: NEGATIVE
BUN SERPL-MCNC: 13 MG/DL
CALCIUM SERPL-MCNC: 9.7 MG/DL
CHLORIDE SERPL-SCNC: 104 MMOL/L
CHOLEST SERPL-MCNC: 171 MG/DL
CO2 SERPL-SCNC: 22 MMOL/L
COLOR: YELLOW
CREAT SERPL-MCNC: 0.64 MG/DL
EOSINOPHIL # BLD AUTO: 0.08 K/UL
EOSINOPHIL NFR BLD AUTO: 1.1 %
ESTIMATED AVERAGE GLUCOSE: 111 MG/DL
FERRITIN SERPL-MCNC: 12 NG/ML
FOLATE SERPL-MCNC: 11.7 NG/ML
GLUCOSE QUALITATIVE U: NEGATIVE
GLUCOSE SERPL-MCNC: 90 MG/DL
HBA1C MFR BLD HPLC: 5.5 %
HCT VFR BLD CALC: 36.3 %
HDLC SERPL-MCNC: 50 MG/DL
HGB BLD-MCNC: 10.7 G/DL
HYALINE CASTS: 4 /LPF
IMM GRANULOCYTES NFR BLD AUTO: 0.3 %
IRON SATN MFR SERPL: 10 %
IRON SERPL-MCNC: 50 UG/DL
KETONES URINE: NEGATIVE
LDLC SERPL CALC-MCNC: 90 MG/DL
LEUKOCYTE ESTERASE URINE: NEGATIVE
LYMPHOCYTES # BLD AUTO: 2.22 K/UL
LYMPHOCYTES NFR BLD AUTO: 30.1 %
MAN DIFF?: NORMAL
MCHC RBC-ENTMCNC: 22.8 PG
MCHC RBC-ENTMCNC: 29.5 GM/DL
MCV RBC AUTO: 77.4 FL
MICROSCOPIC-UA: NORMAL
MONOCYTES # BLD AUTO: 0.68 K/UL
MONOCYTES NFR BLD AUTO: 9.2 %
NEUTROPHILS # BLD AUTO: 4.36 K/UL
NEUTROPHILS NFR BLD AUTO: 59 %
NITRITE URINE: NEGATIVE
NONHDLC SERPL-MCNC: 122 MG/DL
PH URINE: 5.5
PLATELET # BLD AUTO: 328 K/UL
POTASSIUM SERPL-SCNC: 4.8 MMOL/L
PROT SERPL-MCNC: 7.2 G/DL
PROTEIN URINE: NEGATIVE
RBC # BLD: 4.69 M/UL
RBC # FLD: 18.1 %
RED BLOOD CELLS URINE: 1 /HPF
SODIUM SERPL-SCNC: 139 MMOL/L
SPECIFIC GRAVITY URINE: 1.02
SQUAMOUS EPITHELIAL CELLS: 4 /HPF
TIBC SERPL-MCNC: 513 UG/DL
TRIGL SERPL-MCNC: 156 MG/DL
TSH SERPL-ACNC: 2.27 UIU/ML
UIBC SERPL-MCNC: 464 UG/DL
UROBILINOGEN URINE: NORMAL
VIT B12 SERPL-MCNC: 656 PG/ML
WBC # FLD AUTO: 7.38 K/UL
WHITE BLOOD CELLS URINE: 1 /HPF

## 2021-08-10 ENCOUNTER — APPOINTMENT (OUTPATIENT)
Dept: FAMILY MEDICINE | Facility: CLINIC | Age: 36
End: 2021-08-10

## 2021-08-23 ENCOUNTER — APPOINTMENT (OUTPATIENT)
Dept: ORTHOPEDIC SURGERY | Facility: CLINIC | Age: 36
End: 2021-08-23

## 2021-08-24 ENCOUNTER — APPOINTMENT (OUTPATIENT)
Dept: FAMILY MEDICINE | Facility: CLINIC | Age: 36
End: 2021-08-24
Payer: COMMERCIAL

## 2021-08-24 VITALS
DIASTOLIC BLOOD PRESSURE: 70 MMHG | SYSTOLIC BLOOD PRESSURE: 116 MMHG | HEART RATE: 90 BPM | HEIGHT: 65 IN | BODY MASS INDEX: 30.66 KG/M2 | TEMPERATURE: 98.2 F | WEIGHT: 184 LBS | RESPIRATION RATE: 14 BRPM | OXYGEN SATURATION: 98 %

## 2021-08-24 DIAGNOSIS — Z00.00 ENCOUNTER FOR GENERAL ADULT MEDICAL EXAMINATION W/OUT ABNORMAL FINDINGS: ICD-10-CM

## 2021-08-24 DIAGNOSIS — E55.9 VITAMIN D DEFICIENCY, UNSPECIFIED: ICD-10-CM

## 2021-08-24 DIAGNOSIS — D50.9 IRON DEFICIENCY ANEMIA, UNSPECIFIED: ICD-10-CM

## 2021-08-24 DIAGNOSIS — N93.9 ABNORMAL UTERINE AND VAGINAL BLEEDING, UNSPECIFIED: ICD-10-CM

## 2021-08-24 LAB — CYTOLOGY CVX/VAG DOC THIN PREP: NORMAL

## 2021-08-24 PROCEDURE — G0442 ANNUAL ALCOHOL SCREEN 15 MIN: CPT

## 2021-08-24 PROCEDURE — G0444 DEPRESSION SCREEN ANNUAL: CPT | Mod: 59

## 2021-08-24 PROCEDURE — 99395 PREV VISIT EST AGE 18-39: CPT | Mod: 25

## 2021-08-24 PROCEDURE — 99072 ADDL SUPL MATRL&STAF TM PHE: CPT

## 2021-08-27 PROBLEM — E55.9 VITAMIN D DEFICIENCY: Status: ACTIVE | Noted: 2021-08-27

## 2021-08-27 PROBLEM — Z00.00 ENCOUNTER FOR PREVENTIVE HEALTH EXAMINATION: Status: ACTIVE | Noted: 2021-08-27

## 2021-08-27 PROBLEM — Z00.00 ENCOUNTER FOR PREVENTIVE HEALTH EXAMINATION: Status: RESOLVED | Noted: 2017-03-17 | Resolved: 2021-08-27

## 2021-08-27 PROBLEM — N93.9 ABNORMAL UTERINE BLEEDING (AUB): Status: ACTIVE | Noted: 2017-11-27

## 2021-08-27 PROBLEM — D50.9 ANEMIA, IRON DEFICIENCY: Status: ACTIVE | Noted: 2017-12-04

## 2021-08-27 NOTE — HEALTH RISK ASSESSMENT
[Good] : ~his/her~  mood as  good [No] : In the past 12 months have you used drugs other than those required for medical reasons? No [No falls in past year] : Patient reported no falls in the past year [0] : 2) Feeling down, depressed, or hopeless: Not at all (0) [PHQ-2 Negative - No further assessment needed] : PHQ-2 Negative - No further assessment needed [Patient reported PAP Smear was normal] : Patient reported PAP Smear was normal [With Family] : lives with family [# of Members in Household ___] :  household currently consist of [unfilled] member(s) [Employed] : employed [] :  [# Of Children ___] : has [unfilled] children [Sexually Active] : sexually active [Feels Safe at Home] : Feels safe at home [Fully functional (bathing, dressing, toileting, transferring, walking, feeding)] : Fully functional (bathing, dressing, toileting, transferring, walking, feeding) [Fully functional (using the telephone, shopping, preparing meals, housekeeping, doing laundry, using] : Fully functional and needs no help or supervision to perform IADLs (using the telephone, shopping, preparing meals, housekeeping, doing laundry, using transportation, managing medications and managing finances) [Reports normal functional visual acuity (ie: able to read med bottle)] : Reports normal functional visual acuity [] : No [Audit-CScore] : 0 [de-identified] : active [de-identified] : tries to be healthy [PLB4Apvyx] : 0 [High Risk Behavior] : no high risk behavior [Reports changes in hearing] : Reports no changes in hearing [Reports changes in vision] : Reports no changes in vision [PapSmearDate] : 2021 [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

## 2021-08-27 NOTE — HISTORY OF PRESENT ILLNESS
[FreeTextEntry1] : 37 yo female presents to the office for a physical. [de-identified] : The patient reports feeling 'okay' states that she is going to the GYN who is monitoring her uterine fibroids, had a DNC in june for AUB, has a history of iron deficiency anemia and states that she can feel when she is 'too low'

## 2021-08-27 NOTE — REVIEW OF SYSTEMS
[Fatigue] : fatigue [Muscle Pain] : muscle pain [Back Pain] : back pain [Negative] : Heme/Lymph [Fever] : no fever [Chills] : no chills [Hot Flashes] : no hot flashes [Night Sweats] : no night sweats [Recent Change In Weight] : ~T no recent weight change [Joint Pain] : no joint pain [Joint Stiffness] : no joint stiffness [Joint Swelling] : no joint swelling [Muscle Weakness] : no muscle weakness [FreeTextEntry9] : chronic LBP

## 2021-09-08 NOTE — PROGRESS NOTE ADULT - ATTENDING COMMENTS
PAtient seen and evaluated and history reviewed.  No complains  No evidence of infection  FHR tracing was category I  For fetal echo today Faringitis    LO QUE NECESITA SABER:    La faringitis o dolor de garganta es la inflamación de los tejidos y estructuras en lagos faringe (garganta). La faringitis es generalmente causada por jay bacteria. También podría ser causada por un resfriado o el virus de la gripe. Otras causas incluyen el fumar, las alergias o el reflujo estomacal.    INSTRUCCIONES SOBRE EL [facility] HOSPITALARIA:    Llame al 911 en andra de presentar lo siguiente:  •Usted tiene dificultad para respirar o tragar porque lagos garganta está inflamada o adolorida.          Regrese a la thanh de emergencias si:  •Usted está babeando porque le duele demasiado tragar.      •Usted tiene fiebre por encima de 102°F (39°C) o le dura más de 3 días.      •Usted está confundido.      •Usted siente sabor a aubree en lagos garganta.      Comuníquese con lagos médico si:  •Lagos dolor de garganta empeora.      •Usted tiene un bulto adolorido en lagos garganta que no se perez después de 5 días.      •Tanya síntomas no mejoran después de 5 días.      •Usted tiene preguntas o inquietudes acerca de lagos condición o cuidado.      Medicamentos:La faringitis viral desaparecerá por sí conor sin necesidad de tratamiento. Lagos dolor de garganta empezará a mejorar dentro de 3 a 5 días en ambos casos de infecciones virales o bacteriales. Es posible que usted necesite alguno de los siguientes:   •Los antibióticostratan las infecciones bacterianas.      •Los DEXTER,delroy el ibuprofeno, ayudan a disminuir la inflamación, el dolor y la fiebre. Los DEXTER pueden causar sangrado estomacal o problemas renales en ciertas personas. Si usted luzma un medicamento anticoagulante, siempre pregúntele a lagos médico si los DEXTER son seguros para usted. Siempre andrew la etiqueta de magen medicamento y siga las instrucciones.      •Acetaminofénalivia el dolor y baja la fiebre. Está disponible sin receta médica. Pregunte la cantidad y la frecuencia con que debe tomarlos. Siga las indicaciones. El acetaminofén puede causar daño en el hígado cuando no se luzma de forma correcta.      •[place] tanya medicamentos delroy se le haya indicado.Consulte con lagos médico si usted dangelo que lagos medicamento no le está ayudando o si presenta efectos secundarios. Infórmele si es alérgico a cualquier medicamento. Mantenga jay lista actualizada de los medicamentos, las vitaminas y los productos herbales que luzma. Incluya los siguientes datos de los medicamentos: cantidad, frecuencia y motivo de administración. Traiga con usted la lista o los envases de las píldoras a tanya citas de seguimiento. Lleve la lista de los medicamentos con usted en andra de jay emergencia.      El manejo de tanya síntomas:  •Daphne gárgaras de agua con sal.Mezcle ¼ de cucharadita de sal en un vaso con 8 onzas de agua tibia y daphne gárgaras. [place] podría ayudar a reducir la inflamación en lagos garganta.      •[place] líquidos delroy se le haya indicado.Es posible que usted necesite ingerir más líquidos de lo habitual. Los líquidos pueden ayudar a aliviar lagos garganta y prevenir la deshidratación. Pregunte cuánto líquido debe marvin cada día y cuáles líquidos son los más adecuados para usted.      •Use un humidificador de vapor fríopara ayudar a humedecer el aire en lagos habitación y aliviar lagos tos.      •Alivie lagos gargantacon pastillas para la tos, hielo y alimentos blandos o helados de agua.      Prevenga la propagación de la faringitis:Cúbrase la boca y nariz cuando tose o estornuda. No comparta alimentos o bebidas. Lávese las debbie frecuentemente. Utilice agua y jabón. Si no tiene agua y jabón disponibles, entonces puede usar un alcohol para debbie en gel.    Acuda a la consulta de control con lagos médico según las indicaciones:Anote tanya preguntas para que se acuerde de hacerlas lisa tanya visitas.

## 2021-10-12 ENCOUNTER — TRANSCRIPTION ENCOUNTER (OUTPATIENT)
Age: 36
End: 2021-10-12

## 2021-11-15 ENCOUNTER — TRANSCRIPTION ENCOUNTER (OUTPATIENT)
Age: 36
End: 2021-11-15

## 2021-11-17 ENCOUNTER — APPOINTMENT (OUTPATIENT)
Dept: FAMILY MEDICINE | Facility: CLINIC | Age: 36
End: 2021-11-17
Payer: COMMERCIAL

## 2021-11-17 VITALS
SYSTOLIC BLOOD PRESSURE: 112 MMHG | RESPIRATION RATE: 14 BRPM | TEMPERATURE: 97.8 F | DIASTOLIC BLOOD PRESSURE: 70 MMHG | HEART RATE: 83 BPM | OXYGEN SATURATION: 98 %

## 2021-11-17 DIAGNOSIS — H66.90 OTITIS MEDIA, UNSPECIFIED, UNSPECIFIED EAR: ICD-10-CM

## 2021-11-17 PROCEDURE — 99072 ADDL SUPL MATRL&STAF TM PHE: CPT

## 2021-11-17 PROCEDURE — 99214 OFFICE O/P EST MOD 30 MIN: CPT

## 2021-11-22 ENCOUNTER — APPOINTMENT (OUTPATIENT)
Dept: OTOLARYNGOLOGY | Facility: CLINIC | Age: 36
End: 2021-11-22
Payer: COMMERCIAL

## 2021-11-22 VITALS
HEIGHT: 65 IN | BODY MASS INDEX: 30.82 KG/M2 | WEIGHT: 185 LBS | TEMPERATURE: 97.9 F | DIASTOLIC BLOOD PRESSURE: 78 MMHG | OXYGEN SATURATION: 99 % | HEART RATE: 86 BPM | SYSTOLIC BLOOD PRESSURE: 102 MMHG

## 2021-11-22 DIAGNOSIS — H65.90 UNSPECIFIED NONSUPPURATIVE OTITIS MEDIA, UNSPECIFIED EAR: ICD-10-CM

## 2021-11-22 DIAGNOSIS — H60.90 UNSPECIFIED OTITIS EXTERNA, UNSPECIFIED EAR: ICD-10-CM

## 2021-11-22 DIAGNOSIS — H92.09 OTALGIA, UNSPECIFIED EAR: ICD-10-CM

## 2021-11-22 DIAGNOSIS — H60.8X1 OTHER OTITIS EXTERNA, RIGHT EAR: ICD-10-CM

## 2021-11-22 DIAGNOSIS — H60.509 UNSPECIFIED ACUTE NONINFECTIVE OTITIS EXTERNA, UNSPECIFIED EAR: ICD-10-CM

## 2021-11-22 PROBLEM — H66.90 ACUTE OTITIS MEDIA: Status: RESOLVED | Noted: 2021-11-17 | Resolved: 2021-12-17

## 2021-11-22 PROCEDURE — 99203 OFFICE O/P NEW LOW 30 MIN: CPT

## 2021-11-22 PROCEDURE — 99072 ADDL SUPL MATRL&STAF TM PHE: CPT

## 2021-11-22 RX ORDER — NAPROXEN SODIUM 550 MG/1
550 TABLET ORAL
Qty: 28 | Refills: 3 | Status: DISCONTINUED | COMMUNITY
Start: 2021-07-20 | End: 2021-11-22

## 2021-11-22 NOTE — PHYSICAL EXAM
[Midline] : trachea located in midline position [Normal] : no rashes [de-identified] : erythematous TM bilaterally, ?JOSE, left ear with extensive drainage suctioned

## 2021-11-22 NOTE — PHYSICAL EXAM
[Normal Outer Ear/Nose] : the outer ears and nose were normal in appearance [No Lymphadenopathy] : no lymphadenopathy [Normal Rate] : normal rate  [Regular Rhythm] : with a regular rhythm [Normal Supraclavicular Nodes] : no supraclavicular lymphadenopathy [Coordination Grossly Intact] : coordination grossly intact [No Focal Deficits] : no focal deficits [Normal Gait] : normal gait [Speech Grossly Normal] : speech grossly normal [Alert and Oriented x3] : oriented to person, place, and time [Normal Mood] : the mood was normal [Normal] : affect was normal and insight and judgment were intact [de-identified] : PND, partially impacted cerumen

## 2021-11-22 NOTE — HISTORY OF PRESENT ILLNESS
[No] : patient does not have a  history of radiation therapy [None] : No associated symptoms are reported. [de-identified] : 36 year old female with two weeks of ear infections, she went ot urgent care and received augmentin. She was having ear drainage. She cannot hear from left ear. She has tinnitus left side as well. She has pain that comes and goes. Throbbing pain. Denies fevers. No muffled hearing. She washed her hair that day normally, and after that had noticed ear pain and drainage. The fluid stopped coming out but the hearing and pain did not improve with abx.  [Hearing Loss] : hearing loss [Ear Fullness] : ear fullness

## 2021-11-22 NOTE — HISTORY OF PRESENT ILLNESS
[FreeTextEntry8] : 37 yo female presents to the office for persistent ear pain. the patient reports that on saturday morning, she was seen at urgent care for ear pain and prescribed augmentin BID x 10 days. she states that the pain started with her left ear, and then progressed to both ears. she denies any fevers, chills, sinus pain/pressure, or PND. she feels that her ears are 'full'.

## 2021-11-22 NOTE — REASON FOR VISIT
[Initial Evaluation] : an initial evaluation for [FreeTextEntry2] : 36 year old female with ear infection both ears.

## 2021-11-22 NOTE — ASSESSMENT
[FreeTextEntry1] : 36 year old female with ?ETD vs OM and left OE. Ciprodex left ear x 7 days, continue flonase. Had completed medrol dose tay. Return if no resolution in symptoms after 10 days.

## 2021-11-22 NOTE — REVIEW OF SYSTEMS
[Earache] : earache [Hearing Loss] : hearing loss [Postnasal Drip] : postnasal drip [Negative] : Respiratory [Nosebleed] : no nosebleeds [Hoarseness] : no hoarseness [Nasal Discharge] : no nasal discharge [Sore Throat] : no sore throat [Chest Pain] : no chest pain [Headache] : no headache [Dizziness] : no dizziness [Fainting] : no fainting [Confusion] : no confusion [Memory Loss] : no memory loss [Unsteady Walking] : no ataxia

## 2022-03-14 ENCOUNTER — APPOINTMENT (OUTPATIENT)
Dept: OTOLARYNGOLOGY | Facility: CLINIC | Age: 37
End: 2022-03-14
Payer: COMMERCIAL

## 2022-03-14 VITALS
HEIGHT: 65 IN | WEIGHT: 180 LBS | SYSTOLIC BLOOD PRESSURE: 120 MMHG | OXYGEN SATURATION: 98 % | HEART RATE: 105 BPM | BODY MASS INDEX: 29.99 KG/M2 | TEMPERATURE: 97.9 F | DIASTOLIC BLOOD PRESSURE: 78 MMHG

## 2022-03-14 DIAGNOSIS — H93.13 TINNITUS, BILATERAL: ICD-10-CM

## 2022-03-14 PROCEDURE — 99072 ADDL SUPL MATRL&STAF TM PHE: CPT

## 2022-03-14 PROCEDURE — 99213 OFFICE O/P EST LOW 20 MIN: CPT

## 2022-03-14 NOTE — HISTORY OF PRESENT ILLNESS
[de-identified] : 36 y/o F with a h/o L OM and OE in November. She was treated with Ciprodex and a Medrol dosepak. Since thaat visit she reports persistent tinnitus.  She is unsure whether it is in one ear or both.  She also has intermittent pain in the L era.

## 2022-03-14 NOTE — CONSULT LETTER
[Courtesy Letter:] : I had the pleasure of seeing your patient, [unfilled], in my office today. [Please see my note below.] : Please see my note below. [Consult Closing:] : Thank you very much for allowing me to participate in the care of this patient.  If you have any questions, please do not hesitate to contact me. [Sincerely,] : Sincerely, [Dear  ___] : Dear ~DANIEL, [FreeTextEntry2] : Alina Dow NP ([facility])\par  [FreeTextEntry3] : Nico Chisholm MD, FACS\par Chief of Otolaryngology [facility]\par  - Dept. of Otolaryngology\par [employer]\par \par

## 2022-04-29 ENCOUNTER — APPOINTMENT (OUTPATIENT)
Dept: FAMILY MEDICINE | Facility: CLINIC | Age: 37
End: 2022-04-29
Payer: COMMERCIAL

## 2022-04-29 VITALS
HEART RATE: 113 BPM | OXYGEN SATURATION: 98 % | WEIGHT: 180 LBS | DIASTOLIC BLOOD PRESSURE: 76 MMHG | TEMPERATURE: 97.9 F | SYSTOLIC BLOOD PRESSURE: 108 MMHG | RESPIRATION RATE: 14 BRPM | BODY MASS INDEX: 29.95 KG/M2

## 2022-04-29 DIAGNOSIS — J20.9 ACUTE BRONCHITIS, UNSPECIFIED: ICD-10-CM

## 2022-04-29 PROCEDURE — 99072 ADDL SUPL MATRL&STAF TM PHE: CPT

## 2022-04-29 PROCEDURE — 99214 OFFICE O/P EST MOD 30 MIN: CPT

## 2022-05-01 RX ORDER — CIPROFLOXACIN AND DEXAMETHASONE 3; 1 MG/ML; MG/ML
0.3-0.1 SUSPENSION/ DROPS AURICULAR (OTIC) TWICE DAILY
Qty: 1 | Refills: 0 | Status: COMPLETED | COMMUNITY
Start: 2021-11-22 | End: 2022-05-01

## 2022-05-01 RX ORDER — METHOCARBAMOL 500 MG/1
500 TABLET, FILM COATED ORAL
Qty: 28 | Refills: 0 | Status: COMPLETED | COMMUNITY
Start: 2021-07-20 | End: 2022-05-01

## 2022-05-01 RX ORDER — AZITHROMYCIN 250 MG/1
250 TABLET, FILM COATED ORAL
Qty: 1 | Refills: 0 | Status: COMPLETED | COMMUNITY
Start: 2022-04-29 | End: 2022-05-01

## 2022-05-01 RX ORDER — METHYLPREDNISOLONE 4 MG/1
4 TABLET ORAL
Qty: 1 | Refills: 0 | Status: COMPLETED | COMMUNITY
Start: 2021-11-22 | End: 2022-05-01

## 2022-05-01 RX ORDER — FLUTICASONE PROPIONATE 50 UG/1
50 SPRAY, METERED NASAL TWICE DAILY
Qty: 1 | Refills: 0 | Status: COMPLETED | COMMUNITY
Start: 2021-11-17 | End: 2022-05-01

## 2022-05-01 RX ORDER — METHYLPREDNISOLONE 4 MG/1
4 TABLET ORAL
Qty: 1 | Refills: 0 | Status: COMPLETED | COMMUNITY
Start: 2021-11-17 | End: 2022-05-01

## 2022-05-01 NOTE — ASSESSMENT
[FreeTextEntry1] : VSS, exam normal\par medication as prescribed, medication education done \par albuterol q 4-6 h prn \par advised to increase fluid intake, rest, and acetaminophen/ibuprofen prn pain/fever\par follow up in 1 week \par follow up in office if sx persists or worsens\par patient verbalizes understanding and is stable upon d/c\par

## 2022-05-01 NOTE — HISTORY OF PRESENT ILLNESS
[FreeTextEntry8] : c/o cough x 2-3 weeks\par for the past 3 days - feeling more tight, and cough is deep\par denies any fever or chills\par energy is normal \par denies any hx of asthma, former smoker- used to smoke 2 cigs a day x 9 years , quit 4 years ago\par baby is 2 years with a cold symptoms \par pcr neg x 2 \par vaccinated\par history of pollen allergies\par self tx- mucinex\par finished a course of azithro 500 mg x 3 days 2 weeks ago post op \par denies any other associated symptoms \par denies any other complaints or concerns at this time

## 2022-05-01 NOTE — PHYSICAL EXAM
[Normal] : no posterior cervical lymphadenopathy and no anterior cervical lymphadenopathy [de-identified] : transmitted upper airway sounds, mild end expiratory wheezing

## 2022-08-04 ENCOUNTER — APPOINTMENT (OUTPATIENT)
Dept: FAMILY MEDICINE | Facility: CLINIC | Age: 37
End: 2022-08-04

## 2022-08-04 VITALS
SYSTOLIC BLOOD PRESSURE: 132 MMHG | HEART RATE: 90 BPM | DIASTOLIC BLOOD PRESSURE: 84 MMHG | RESPIRATION RATE: 14 BRPM | TEMPERATURE: 97.8 F | OXYGEN SATURATION: 98 %

## 2022-08-04 DIAGNOSIS — R51.9 HEADACHE, UNSPECIFIED: ICD-10-CM

## 2022-08-04 PROCEDURE — 99072 ADDL SUPL MATRL&STAF TM PHE: CPT

## 2022-08-04 PROCEDURE — 99214 OFFICE O/P EST MOD 30 MIN: CPT

## 2022-08-11 PROBLEM — R51.9 HEADACHE, UNSPECIFIED: Status: ACTIVE | Noted: 2022-08-11

## 2022-08-11 NOTE — END OF VISIT
[FreeTextEntry3] : I, Dr. Mascorro, personally performed the evaluation and management (E/M) services for this established patient who presents today with (a) new problem(s)/exacerbation of (an) existing condition(s). That E/M includes conducting the examination, assessing all new/exacerbated conditions, and establishing a new plan of care. Today, my nurse practitioner, Alina Dow, was here to observe my evaluation and management services fort his new problem/exacerbated condition to be follow going forward.\par

## 2022-08-11 NOTE — HISTORY OF PRESENT ILLNESS
[FreeTextEntry8] : 38 yo female presents to the office for a headache and two episodes of high blood pressure while at work over the past two days. 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 currently denies any headache, dizziness, blurry vision, chest pain or shortness of breath. states that she developed a headache yesterday while at work, and was nauseous. denies any other symptoms at that time. does not have frequent headaches. did eat fast food/drink alcohol the night before headache episode.

## 2022-10-27 NOTE — OB PROVIDER H&P - NS ED NOTE AC HIGH RISK COUNTRIES
Called patient and informed about referral placed.. Patient stated understanding and had no questions or concerns at this time.      No

## 2022-12-08 ENCOUNTER — APPOINTMENT (OUTPATIENT)
Dept: FAMILY MEDICINE | Facility: CLINIC | Age: 37
End: 2022-12-08

## 2022-12-12 ENCOUNTER — APPOINTMENT (OUTPATIENT)
Dept: FAMILY MEDICINE | Facility: CLINIC | Age: 37
End: 2022-12-12

## 2022-12-12 VITALS
HEART RATE: 106 BPM | TEMPERATURE: 97.4 F | SYSTOLIC BLOOD PRESSURE: 114 MMHG | DIASTOLIC BLOOD PRESSURE: 72 MMHG | RESPIRATION RATE: 14 BRPM | OXYGEN SATURATION: 97 %

## 2022-12-12 DIAGNOSIS — J20.9 ACUTE BRONCHITIS, UNSPECIFIED: ICD-10-CM

## 2022-12-12 DIAGNOSIS — Z20.828 CONTACT WITH AND (SUSPECTED) EXPOSURE TO OTHER VIRAL COMMUNICABLE DISEASES: ICD-10-CM

## 2022-12-12 PROCEDURE — 99214 OFFICE O/P EST MOD 30 MIN: CPT | Mod: 25

## 2022-12-12 PROCEDURE — 99072 ADDL SUPL MATRL&STAF TM PHE: CPT

## 2022-12-12 PROCEDURE — 87804 INFLUENZA ASSAY W/OPTIC: CPT | Mod: QW

## 2022-12-12 RX ORDER — ALBUTEROL SULFATE 90 UG/1
108 (90 BASE) INHALANT RESPIRATORY (INHALATION)
Qty: 1 | Refills: 1 | Status: ACTIVE | COMMUNITY
Start: 2022-12-12 | End: 1900-01-01

## 2022-12-12 NOTE — REVIEW OF SYSTEMS
[Chills] : chills [Nasal Discharge] : nasal discharge [Postnasal Drip] : postnasal drip [Wheezing] : wheezing [Cough] : cough [Muscle Pain] : muscle pain [Negative] : Musculoskeletal

## 2022-12-12 NOTE — PHYSICAL EXAM
[Normal] : no joint swelling and grossly normal strength and tone [de-identified] : + end expiratory wheezing [de-identified] : FROM x 4, negative SLR

## 2022-12-12 NOTE — ASSESSMENT
[FreeTextEntry1] : VSS\par rapid flu test negative\par medication as prescribed, medication education done \par consider CXR if symptoms are persistent\par advised to increase fluid intake, rest, and acetaminophen/ibuprofen prn pain/fever\par follow up in office if sx persists or worsens\par patient verbalizes understanding and is stable upon d/c\par

## 2022-12-12 NOTE — HISTORY OF PRESENT ILLNESS
[FreeTextEntry8] : 1) c/o cough and congestion since tuesday \par fever for 2 days which resolved \par now still coughing \par sx have been ongoing since since last week\par symtoms include: productive cough \par cough worse at night\par denies any sob \par Sick contacts- daughters with flu \par recent travel- none\par self treatment- mucinex \par tested prior- tested neg for covid on friday \par vaccine status- utd on covid vaccine, flu shot the week prior\par denies any other associated symptoms\par \par 2) left sided thigh pain x 3-4 months\par has to warm up for symptoms to relieve\par starts at buttocks \par denies any numbness or tingling\par hurts with walking or standing to walking \par denies any history of trauma  \par denies any other associated symptoms \par denies any other complaints or concerns at this time\par

## 2022-12-26 ENCOUNTER — APPOINTMENT (OUTPATIENT)
Dept: RADIOLOGY | Facility: IMAGING CENTER | Age: 37
End: 2022-12-26

## 2022-12-26 ENCOUNTER — RESULT REVIEW (OUTPATIENT)
Age: 37
End: 2022-12-26

## 2022-12-26 ENCOUNTER — OUTPATIENT (OUTPATIENT)
Dept: OUTPATIENT SERVICES | Facility: HOSPITAL | Age: 37
LOS: 1 days | End: 2022-12-26
Payer: COMMERCIAL

## 2022-12-26 DIAGNOSIS — Z98.890 OTHER SPECIFIED POSTPROCEDURAL STATES: Chronic | ICD-10-CM

## 2022-12-26 DIAGNOSIS — M79.652 PAIN IN LEFT THIGH: ICD-10-CM

## 2022-12-26 DIAGNOSIS — Z90.49 ACQUIRED ABSENCE OF OTHER SPECIFIED PARTS OF DIGESTIVE TRACT: Chronic | ICD-10-CM

## 2022-12-26 PROCEDURE — 73552 X-RAY EXAM OF FEMUR 2/>: CPT | Mod: 26,LT

## 2022-12-26 PROCEDURE — 73552 X-RAY EXAM OF FEMUR 2/>: CPT

## 2022-12-26 PROCEDURE — 73502 X-RAY EXAM HIP UNI 2-3 VIEWS: CPT | Mod: 26,LT

## 2022-12-26 PROCEDURE — 73502 X-RAY EXAM HIP UNI 2-3 VIEWS: CPT

## 2022-12-28 ENCOUNTER — NON-APPOINTMENT (OUTPATIENT)
Age: 37
End: 2022-12-28

## 2023-03-17 DIAGNOSIS — G89.29 LOW BACK PAIN, UNSPECIFIED: ICD-10-CM

## 2023-03-17 DIAGNOSIS — M43.17 SPONDYLOLISTHESIS, LUMBOSACRAL REGION: ICD-10-CM

## 2023-03-17 DIAGNOSIS — M54.50 LOW BACK PAIN, UNSPECIFIED: ICD-10-CM

## 2023-04-14 DIAGNOSIS — M79.652 PAIN IN LEFT THIGH: ICD-10-CM

## 2023-05-03 ENCOUNTER — APPOINTMENT (OUTPATIENT)
Dept: FAMILY MEDICINE | Facility: CLINIC | Age: 38
End: 2023-05-03
Payer: COMMERCIAL

## 2023-05-03 VITALS
HEART RATE: 93 BPM | OXYGEN SATURATION: 98 % | SYSTOLIC BLOOD PRESSURE: 110 MMHG | RESPIRATION RATE: 14 BRPM | TEMPERATURE: 98 F | DIASTOLIC BLOOD PRESSURE: 70 MMHG

## 2023-05-03 DIAGNOSIS — R73.03 PREDIABETES.: ICD-10-CM

## 2023-05-03 PROCEDURE — 99214 OFFICE O/P EST MOD 30 MIN: CPT

## 2023-05-03 RX ORDER — AZITHROMYCIN 250 MG/1
250 TABLET, FILM COATED ORAL
Qty: 1 | Refills: 0 | Status: COMPLETED | COMMUNITY
Start: 2022-12-12 | End: 2023-05-03

## 2023-05-03 RX ORDER — PROMETHAZINE HYDROCHLORIDE AND DEXTROMETHORPHAN HYDROBROMIDE ORAL SOLUTION 15; 6.25 MG/5ML; MG/5ML
6.25-15 SOLUTION ORAL
Qty: 1 | Refills: 0 | Status: COMPLETED | COMMUNITY
Start: 2022-12-12 | End: 2023-05-03

## 2023-05-03 RX ORDER — DOXYCYCLINE 100 MG/1
100 CAPSULE ORAL
Qty: 14 | Refills: 0 | Status: COMPLETED | COMMUNITY
Start: 2022-04-29 | End: 2023-05-03

## 2023-05-03 RX ORDER — PROMETHAZINE HYDROCHLORIDE AND DEXTROMETHORPHAN HYDROBROMIDE ORAL SOLUTION 15; 6.25 MG/5ML; MG/5ML
6.25-15 SOLUTION ORAL
Qty: 120 | Refills: 0 | Status: COMPLETED | COMMUNITY
Start: 2022-04-29 | End: 2023-05-03

## 2023-05-03 NOTE — ASSESSMENT
[FreeTextEntry1] : VSS- exam normal \par reviewed labs with patient \par referred as above \par will follow up labs \par advised lifestyle modifications including, monitoring diet, weight loss, exercise \par follow up in 3 months \par follow up in office if sx persists or worsens\par patient verbalizes understanding and is stable upon d/c\par

## 2023-05-03 NOTE — HISTORY OF PRESENT ILLNESS
[FreeTextEntry8] : 37 yo f, pmhx of thalassemia, c/o elevated a1c\par went to see her gyn for annual \par did Hgb A1c 6.1 % along with low Hgb of 10.1 duet to thalassemia\par labs done on 04/20/2023 \par gained weight, was about 190 lbs last year\par now down 179 lbs with diet control and exercise\par history of elevated sugars during pregnancy but delivered at 5 months and no diagnosis of GDM at the time \par daughter is a type 1 dm- she is 19 yo \par has not checked sugars at home\par mother was diagnosed with DM in her 50s\par denies any other associated symptoms\par \par 2) history of external hemorrhoids \par started post pregnancy \par has been chronic \par denies any bleeding \par would like to see colorectal surgery\par denies any other complaints or concerns at this time

## 2023-05-17 ENCOUNTER — RX RENEWAL (OUTPATIENT)
Age: 38
End: 2023-05-17

## 2023-06-09 ENCOUNTER — APPOINTMENT (OUTPATIENT)
Dept: COLORECTAL SURGERY | Facility: CLINIC | Age: 38
End: 2023-06-09
Payer: COMMERCIAL

## 2023-06-09 VITALS
HEIGHT: 64 IN | HEART RATE: 88 BPM | OXYGEN SATURATION: 99 % | TEMPERATURE: 98.2 F | DIASTOLIC BLOOD PRESSURE: 75 MMHG | SYSTOLIC BLOOD PRESSURE: 112 MMHG | RESPIRATION RATE: 14 BRPM | WEIGHT: 180 LBS | BODY MASS INDEX: 30.73 KG/M2

## 2023-06-09 DIAGNOSIS — K64.4 RESIDUAL HEMORRHOIDAL SKIN TAGS: ICD-10-CM

## 2023-06-09 DIAGNOSIS — K64.9 UNSPECIFIED HEMORRHOIDS: ICD-10-CM

## 2023-06-09 PROCEDURE — 99204 OFFICE O/P NEW MOD 45 MIN: CPT | Mod: 25

## 2023-06-09 PROCEDURE — 46220 EXCISE ANAL EXT TAG/PAPILLA: CPT

## 2023-06-09 RX ORDER — ALBUTEROL SULFATE 90 UG/1
108 (90 BASE) INHALANT RESPIRATORY (INHALATION)
Qty: 1 | Refills: 1 | Status: DISCONTINUED | COMMUNITY
Start: 2022-04-29 | End: 2023-06-09

## 2023-06-09 RX ORDER — MELOXICAM 7.5 MG/1
7.5 TABLET ORAL
Qty: 20 | Refills: 0 | Status: DISCONTINUED | COMMUNITY
Start: 2022-12-28 | End: 2023-06-09

## 2023-06-09 NOTE — HISTORY OF PRESENT ILLNESS
[FreeTextEntry1] : Patient is a 39 yo female here with complaints of external hemorrhoids/skin tags.  States have been there since she was pregnant about 3 years ago.  She denies any bleeding.  She passes stool daily with Senna. Her main c/o is of annoyance/irritation from the skin tag\par \par  x1, cssection x1

## 2023-06-09 NOTE — PROCEDURE
[FreeTextEntry1] : 1% lidocaine with epi infiltrated for local anesthesia.  The anterior skin tag is excised sharply and sent for pathology.  Hemostasis obtained with Monsel's.  Patient tolerated it well

## 2023-06-09 NOTE — ASSESSMENT
[FreeTextEntry1] : s/p excision \par sitz baths\par tylenol/advil\par pt will try miralax for constipation

## 2023-06-09 NOTE — PHYSICAL EXAM
[Normal Breath Sounds] : Normal breath sounds [Normal Heart Sounds] : normal heart sounds [Normal Rate and Rhythm] : normal rate and rhythm [No Rash or Lesion] : No rash or lesion [Alert] : alert [Oriented to Person] : oriented to person [Oriented to Place] : oriented to place [Oriented to Time] : oriented to time [Calm] : calm [Excoriation] : no perianal excoriation [Fistula] : no fistulas [Wart] : no warts [Ulcer ___ cm] : no ulcers [Normal] : was normal [None] : there was no rectal mass  [de-identified] : obese abdomen, +BS [de-identified] : Anterior midline skin tag [de-identified] : Anoscopy reveals very small internal hemorrhoids [de-identified] : well nourished female [de-identified] : NC/AT [de-identified] : TIFFANY/+ROM [de-identified] : Intact

## 2023-06-15 NOTE — OB PROVIDER DELIVERY SUMMARY - AS DELIV COMPLICATIONS OB
abnormal fetal heart rate tracing/prolonged rupture of membranes
Bed/Stretcher in lowest position, wheels locked, appropriate side rails in place/Call bell, personal items and telephone in reach/Instruct patient to call for assistance before getting out of bed/chair/stretcher/Non-slip footwear applied when patient is off stretcher/[place] to call system/Physically safe environment - no spills, clutter or unnecessary equipment/Purposeful proactive rounding/Room/bathroom lighting operational, light cord in reach

## 2023-06-21 LAB — CORE LAB BIOPSY: NORMAL

## 2024-07-09 NOTE — OB RN PREOPERATIVE CHECKLIST - NS_PREOPBLOODCONS_OBGYN_ALL_OB
EMERGENCY DEPARTMENT ENCOUNTER    Pt Name: Marlys Villalobos  MRN: [id number]  Birthdate 2008  Date of evaluation: 7/9/24  CHIEF COMPLAINT       Chief Complaint   Patient presents with    Animal Bite     HISTORY OF PRESENT ILLNESS   Patient is a 16-year-old female who presents with her mom and her uncle for evaluation of injury sustained when the dog bit her on the face.  The dog does belong to her uncle and has all its shots.  patient's mom states that her immunizations are up-to-date.  The patient does report pain to the philtrum area and nose area.  She denies any other injuries.             REVIEW OF SYSTEMS     Review of Systems   Constitutional:  Negative for chills and fever.   HENT:  Negative for ear pain, rhinorrhea and sore throat.    Eyes:  Negative for pain, discharge and itching.   Respiratory:  Negative for cough and wheezing.    Cardiovascular:  Negative for chest pain and palpitations.   Gastrointestinal:  Negative for nausea and vomiting.   Genitourinary:  Negative for difficulty urinating and dysuria.   Musculoskeletal:  Negative for back pain and myalgias.   Skin:  Positive for wound. Negative for color change.   Neurological:  Negative for dizziness and headaches.   Psychiatric/Behavioral:  Negative for dysphoric mood.      PASTMEDICAL HISTORY     Past Medical History:   Diagnosis Date    Asthma      Past Problem List  There is no problem list on file for this patient.    SURGICAL HISTORY       Past Surgical History:   Procedure Laterality Date    REL OF TONGUE TIE AND CLOSURE WITH FLAP  2011     CURRENT MEDICATIONS       Discharge Medication List as of 7/9/2024  3:42 PM        CONTINUE these medications which have NOT CHANGED    Details   albuterol sulfate HFA (VENTOLIN HFA) 108 (90 Base) MCG/ACT inhaler Inhale 2 puffs into the lungs every 6 hours as needed for Wheezing, Disp-1 Inhaler, R-0Normal      Spacer/Aero Chamber Mouthpiece MISC PRN Starting Mon 4/29/2019, Disp-1 each, R-0, Normal    
eMERGENCY dEPARTMENT eNCOUnter   Independent Attestation     Pt Name: Marlys Villalobos  MRN: [id number]  Birthdate 2008  Date of evaluation: 7/9/24     Marlys Villalobos is a 16 y.o. female with CC: Animal Bite      Based on the medical record the care appears appropriate.  I was personally available for consultation in the Emergency Department.    Mino Castellanos DO  Attending Emergency Physician                 Mino Castellanos DO  07/09/24 0383    
n/a

## 2024-10-01 NOTE — OB PROVIDER TRIAGE NOTE - BIRTH SEX
Interval History: No acute overnight events. Pt was fevering throughout the day yesterday. Last recorded to be febrile at 8p with temp of 100.4F. Feeling better this AM. No CP, SOB, rigors, n/v, abdominal pain. No urinary complaints. Blood cultures (+) E.faecalis, and patient currently on vancomycin. Susceptibility still pending. ID consulted. Pt is c/o some diarrhea after starting the vanc.     Review of Systems  Objective:     Vital Signs (Most Recent):  Temp: 98.8 °F (37.1 °C) (10/01/24 0800)  Pulse: 82 (10/01/24 1124)  Resp: 18 (10/01/24 0800)  BP: (!) 109/57 (10/01/24 0800)  SpO2: (!) 93 % (10/01/24 0800) Vital Signs (24h Range):  Temp:  [98.8 °F (37.1 °C)-103.1 °F (39.5 °C)] 98.8 °F (37.1 °C)  Pulse:  [] 82  Resp:  [18] 18  SpO2:  [75 %-94 %] 93 %  BP: ()/(53-57) 109/57     Weight: 95.2 kg (209 lb 14.1 oz)  Body mass index is 39.66 kg/m².    Intake/Output Summary (Last 24 hours) at 10/1/2024 1402  Last data filed at 10/1/2024 0547  Gross per 24 hour   Intake 220 ml   Output 500 ml   Net -280 ml         Physical Exam  Constitutional:       Appearance: She is obese.   HENT:      Head: Normocephalic and atraumatic.      Mouth/Throat:      Pharynx: Oropharynx is clear.   Eyes:      Pupils: Pupils are equal, round, and reactive to light.   Cardiovascular:      Rate and Rhythm: Normal rate and regular rhythm.      Pulses: Normal pulses.      Heart sounds: Normal heart sounds.   Pulmonary:      Effort: Pulmonary effort is normal.      Breath sounds: Normal breath sounds.   Abdominal:      General: Abdomen is flat. There is no distension.      Tenderness: There is no abdominal tenderness.   Musculoskeletal:      Right lower leg: Edema present.      Left lower leg: Edema present.      Comments: Chronic skin thickening of the lower extremities   Skin:     General: Skin is warm and dry.   Neurological:      General: No focal deficit present.      Mental Status: She is alert.   Psychiatric:         Mood and  Affect: Mood normal.             Significant Labs: All pertinent labs within the past 24 hours have been reviewed.  CBC:   Recent Labs   Lab 09/29/24  1706 09/30/24  0657 10/01/24  0400   WBC 12.79* 11.80 9.82   HGB 9.3* 8.3* 7.6*   HCT 29.5* 25.8* 26.3*    226 204     CMP:   Recent Labs   Lab 09/29/24  1706 09/30/24  0657 10/01/24  0400    138 140   K 4.0 4.2 3.0*    106 105   CO2 22* 23 28   * 112* 105   BUN 22 26* 29*   CREATININE 1.4 1.3 1.2   CALCIUM 9.7 9.2 8.9   PROT 7.8 7.0 6.1   ALBUMIN 3.4* 3.0* 2.7*   BILITOT 0.8 0.4 0.3   ALKPHOS 64 57 49*   AST 13 13 7*   ALT 8* 6* <5*   ANIONGAP 12 9 7*       Significant Imaging: I have reviewed all pertinent imaging results/findings within the past 24 hours.   Female

## 2024-10-02 NOTE — OB PROVIDER H&P - NSRISKFACTORS_OBGYN_ALL_OB
Ms. Larson 
No
